# Patient Record
Sex: FEMALE | Race: WHITE | Employment: OTHER | URBAN - METROPOLITAN AREA
[De-identification: names, ages, dates, MRNs, and addresses within clinical notes are randomized per-mention and may not be internally consistent; named-entity substitution may affect disease eponyms.]

---

## 2017-11-28 ENCOUNTER — HOSPITAL ENCOUNTER (OUTPATIENT)
Dept: OTHER | Age: 77
Discharge: HOME | End: 2017-11-28
Attending: INTERNAL MEDICINE | Admitting: INTERNAL MEDICINE

## 2017-11-28 DIAGNOSIS — R06.02 SHORT OF BREATH ON EXERTION: ICD-10-CM

## 2017-12-01 ENCOUNTER — HOSPITAL ENCOUNTER (OUTPATIENT)
Dept: ULTRASOUND IMAGING | Age: 77
Discharge: HOME | End: 2017-12-01
Attending: INTERNAL MEDICINE | Admitting: INTERNAL MEDICINE

## 2017-12-01 ENCOUNTER — HOSPITAL ENCOUNTER (OUTPATIENT)
Dept: NON INVASIVE DIAGNOSTICS | Age: 77
Discharge: HOME | End: 2017-12-01
Attending: INTERNAL MEDICINE | Admitting: INTERNAL MEDICINE

## 2017-12-01 DIAGNOSIS — N18.9 CHRONIC KIDNEY DISEASE, UNSPECIFIED CKD STAGE: ICD-10-CM

## 2017-12-01 DIAGNOSIS — C92.10 CHRONIC MYELOID LEUKEMIA, BCR/ABL-POSITIVE, NOT HAVING ACHIEVED REMISSION (HCC): ICD-10-CM

## 2017-12-01 LAB
LV EF: 73 %
LVEF MODALITY: NORMAL

## 2018-02-26 RX ORDER — DIGOXIN 125 UG/1
TABLET ORAL
Qty: 90 TABLET | OUTPATIENT
Start: 2018-02-26

## 2018-06-05 ENCOUNTER — HOSPITAL ENCOUNTER (OUTPATIENT)
Dept: OTHER | Age: 78
Discharge: HOME | End: 2018-06-05
Attending: CLINICAL NURSE SPECIALIST | Admitting: CLINICAL NURSE SPECIALIST

## 2018-06-05 DIAGNOSIS — R06.00 DYSPNEA AND RESPIRATORY ABNORMALITY: ICD-10-CM

## 2018-06-05 DIAGNOSIS — R06.89 DYSPNEA AND RESPIRATORY ABNORMALITY: ICD-10-CM

## 2019-06-20 ENCOUNTER — HOSPITAL ENCOUNTER (INPATIENT)
Age: 79
LOS: 12 days | Discharge: HOME HEALTH | DRG: 871 | End: 2019-07-02
Attending: EMERGENCY MEDICINE | Admitting: HOSPITALIST
Payer: MEDICARE

## 2019-06-20 ENCOUNTER — APPOINTMENT (OUTPATIENT)
Dept: GENERAL RADIOLOGY | Age: 79
DRG: 871 | End: 2019-06-20
Payer: MEDICARE

## 2019-06-20 DIAGNOSIS — N30.01 ACUTE CYSTITIS WITH HEMATURIA: ICD-10-CM

## 2019-06-20 DIAGNOSIS — I21.4 NSTEMI (NON-ST ELEVATED MYOCARDIAL INFARCTION) (HCC): Primary | ICD-10-CM

## 2019-06-20 DIAGNOSIS — D72.829 LEUKOCYTOSIS, UNSPECIFIED TYPE: ICD-10-CM

## 2019-06-20 DIAGNOSIS — R79.89 ELEVATED BRAIN NATRIURETIC PEPTIDE (BNP) LEVEL: ICD-10-CM

## 2019-06-20 DIAGNOSIS — N17.9 AKI (ACUTE KIDNEY INJURY) (HCC): ICD-10-CM

## 2019-06-20 DIAGNOSIS — E86.0 DEHYDRATION: ICD-10-CM

## 2019-06-20 PROBLEM — N30.00 ACUTE CYSTITIS WITHOUT HEMATURIA: Status: ACTIVE | Noted: 2019-06-20

## 2019-06-20 PROBLEM — L03.114 CELLULITIS OF LEFT UPPER EXTREMITY: Status: ACTIVE | Noted: 2019-06-20

## 2019-06-20 PROBLEM — I50.43 CHF (CONGESTIVE HEART FAILURE), NYHA CLASS I, ACUTE ON CHRONIC, COMBINED (HCC): Status: ACTIVE | Noted: 2019-06-20

## 2019-06-20 LAB
A/G RATIO: 0.8 (ref 1.1–2.2)
ALBUMIN SERPL-MCNC: 3.2 G/DL (ref 3.4–5)
ALP BLD-CCNC: 97 U/L (ref 40–129)
ALT SERPL-CCNC: 6 U/L (ref 10–40)
ANION GAP SERPL CALCULATED.3IONS-SCNC: 17 MMOL/L (ref 3–16)
AST SERPL-CCNC: 14 U/L (ref 15–37)
BASOPHILS ABSOLUTE: 0.1 K/UL (ref 0–0.2)
BASOPHILS RELATIVE PERCENT: 0.8 %
BILIRUB SERPL-MCNC: 0.4 MG/DL (ref 0–1)
BILIRUBIN URINE: ABNORMAL
BLOOD, URINE: NEGATIVE
BUN BLDV-MCNC: 49 MG/DL (ref 7–20)
CALCIUM SERPL-MCNC: 9.3 MG/DL (ref 8.3–10.6)
CHLORIDE BLD-SCNC: 95 MMOL/L (ref 99–110)
CLARITY: ABNORMAL
CO2: 22 MMOL/L (ref 21–32)
COLOR: ABNORMAL
CREAT SERPL-MCNC: 2.4 MG/DL (ref 0.6–1.2)
EOSINOPHILS ABSOLUTE: 0.4 K/UL (ref 0–0.6)
EOSINOPHILS RELATIVE PERCENT: 2.3 %
EPITHELIAL CELLS, UA: 0 /HPF (ref 0–5)
GFR AFRICAN AMERICAN: 23
GFR NON-AFRICAN AMERICAN: 19
GLOBULIN: 3.9 G/DL
GLUCOSE BLD-MCNC: 105 MG/DL (ref 70–99)
GLUCOSE URINE: NEGATIVE MG/DL
HCT VFR BLD CALC: 43.4 % (ref 36–48)
HEMOGLOBIN: 14 G/DL (ref 12–16)
HYALINE CASTS: 8 /LPF (ref 0–8)
KETONES, URINE: NEGATIVE MG/DL
LACTIC ACID: 1.8 MMOL/L (ref 0.4–2)
LEUKOCYTE ESTERASE, URINE: ABNORMAL
LYMPHOCYTES ABSOLUTE: 0.5 K/UL (ref 1–5.1)
LYMPHOCYTES RELATIVE PERCENT: 3.4 %
MCH RBC QN AUTO: 28.8 PG (ref 26–34)
MCHC RBC AUTO-ENTMCNC: 32.2 G/DL (ref 31–36)
MCV RBC AUTO: 89.4 FL (ref 80–100)
MICROSCOPIC EXAMINATION: YES
MONOCYTES ABSOLUTE: 1.5 K/UL (ref 0–1.3)
MONOCYTES RELATIVE PERCENT: 9.4 %
NEUTROPHILS ABSOLUTE: 13.1 K/UL (ref 1.7–7.7)
NEUTROPHILS RELATIVE PERCENT: 84.1 %
NITRITE, URINE: NEGATIVE
PDW BLD-RTO: 19.1 % (ref 12.4–15.4)
PH UA: 5 (ref 5–8)
PLATELET # BLD: 639 K/UL (ref 135–450)
PMV BLD AUTO: 7.4 FL (ref 5–10.5)
POTASSIUM REFLEX MAGNESIUM: 4.9 MMOL/L (ref 3.5–5.1)
PRO-BNP: ABNORMAL PG/ML (ref 0–449)
PROTEIN UA: 30 MG/DL
RBC # BLD: 4.85 M/UL (ref 4–5.2)
RBC UA: ABNORMAL /HPF (ref 0–2)
SODIUM BLD-SCNC: 134 MMOL/L (ref 136–145)
SPECIFIC GRAVITY UA: 1.02 (ref 1–1.03)
TOTAL PROTEIN: 7.1 G/DL (ref 6.4–8.2)
TROPONIN: 0.25 NG/ML
URINE REFLEX TO CULTURE: YES
URINE TYPE: ABNORMAL
UROBILINOGEN, URINE: 0.2 E.U./DL
WBC # BLD: 15.6 K/UL (ref 4–11)
WBC UA: 33 /HPF (ref 0–5)

## 2019-06-20 PROCEDURE — 96361 HYDRATE IV INFUSION ADD-ON: CPT

## 2019-06-20 PROCEDURE — 84484 ASSAY OF TROPONIN QUANT: CPT

## 2019-06-20 PROCEDURE — 2580000003 HC RX 258: Performed by: EMERGENCY MEDICINE

## 2019-06-20 PROCEDURE — 93005 ELECTROCARDIOGRAM TRACING: CPT | Performed by: EMERGENCY MEDICINE

## 2019-06-20 PROCEDURE — 96372 THER/PROPH/DIAG INJ SC/IM: CPT

## 2019-06-20 PROCEDURE — 6370000000 HC RX 637 (ALT 250 FOR IP): Performed by: NURSE PRACTITIONER

## 2019-06-20 PROCEDURE — 80053 COMPREHEN METABOLIC PANEL: CPT

## 2019-06-20 PROCEDURE — 81001 URINALYSIS AUTO W/SCOPE: CPT

## 2019-06-20 PROCEDURE — 85025 COMPLETE CBC W/AUTO DIFF WBC: CPT

## 2019-06-20 PROCEDURE — 36415 COLL VENOUS BLD VENIPUNCTURE: CPT

## 2019-06-20 PROCEDURE — 96365 THER/PROPH/DIAG IV INF INIT: CPT

## 2019-06-20 PROCEDURE — 99285 EMERGENCY DEPT VISIT HI MDM: CPT

## 2019-06-20 PROCEDURE — 71046 X-RAY EXAM CHEST 2 VIEWS: CPT

## 2019-06-20 PROCEDURE — 6360000002 HC RX W HCPCS: Performed by: NURSE PRACTITIONER

## 2019-06-20 PROCEDURE — 2580000003 HC RX 258: Performed by: NURSE PRACTITIONER

## 2019-06-20 PROCEDURE — 84439 ASSAY OF FREE THYROXINE: CPT

## 2019-06-20 PROCEDURE — 87077 CULTURE AEROBIC IDENTIFY: CPT

## 2019-06-20 PROCEDURE — 6360000002 HC RX W HCPCS: Performed by: EMERGENCY MEDICINE

## 2019-06-20 PROCEDURE — 87086 URINE CULTURE/COLONY COUNT: CPT

## 2019-06-20 PROCEDURE — 83605 ASSAY OF LACTIC ACID: CPT

## 2019-06-20 PROCEDURE — 87186 SC STD MICRODIL/AGAR DIL: CPT

## 2019-06-20 PROCEDURE — 84443 ASSAY THYROID STIM HORMONE: CPT

## 2019-06-20 PROCEDURE — 1200000000 HC SEMI PRIVATE

## 2019-06-20 PROCEDURE — 83880 ASSAY OF NATRIURETIC PEPTIDE: CPT

## 2019-06-20 RX ORDER — ROPINIROLE 0.25 MG/1
0.25 TABLET, FILM COATED ORAL NIGHTLY
COMMUNITY

## 2019-06-20 RX ORDER — AMITRIPTYLINE HYDROCHLORIDE 10 MG/1
10 TABLET, FILM COATED ORAL NIGHTLY
Status: DISCONTINUED | OUTPATIENT
Start: 2019-06-20 | End: 2019-07-02 | Stop reason: HOSPADM

## 2019-06-20 RX ORDER — ACETAMINOPHEN 325 MG/1
650 TABLET ORAL EVERY 4 HOURS PRN
Status: DISCONTINUED | OUTPATIENT
Start: 2019-06-20 | End: 2019-07-02 | Stop reason: HOSPADM

## 2019-06-20 RX ORDER — PREDNISONE 1 MG/1
5 TABLET ORAL DAILY
Status: DISCONTINUED | OUTPATIENT
Start: 2019-06-21 | End: 2019-07-02 | Stop reason: HOSPADM

## 2019-06-20 RX ORDER — HEPARIN SODIUM 5000 [USP'U]/ML
5000 INJECTION, SOLUTION INTRAVENOUS; SUBCUTANEOUS EVERY 8 HOURS SCHEDULED
Status: DISCONTINUED | OUTPATIENT
Start: 2019-06-20 | End: 2019-06-20

## 2019-06-20 RX ORDER — FUROSEMIDE 10 MG/ML
40 INJECTION INTRAMUSCULAR; INTRAVENOUS ONCE
Status: COMPLETED | OUTPATIENT
Start: 2019-06-20 | End: 2019-06-20

## 2019-06-20 RX ORDER — DIAZEPAM 5 MG/1
5 TABLET ORAL EVERY 12 HOURS PRN
Status: DISCONTINUED | OUTPATIENT
Start: 2019-06-20 | End: 2019-06-26

## 2019-06-20 RX ORDER — LISINOPRIL 5 MG/1
5 TABLET ORAL DAILY
Status: DISCONTINUED | OUTPATIENT
Start: 2019-06-21 | End: 2019-06-21

## 2019-06-20 RX ORDER — PREDNISONE 1 MG/1
5 TABLET ORAL DAILY
Refills: 0 | COMMUNITY
Start: 2019-03-20

## 2019-06-20 RX ORDER — ROPINIROLE 0.5 MG/1
0.25 TABLET, FILM COATED ORAL NIGHTLY
Status: DISCONTINUED | OUTPATIENT
Start: 2019-06-20 | End: 2019-07-02 | Stop reason: HOSPADM

## 2019-06-20 RX ORDER — SODIUM CHLORIDE 0.9 % (FLUSH) 0.9 %
10 SYRINGE (ML) INJECTION PRN
Status: DISCONTINUED | OUTPATIENT
Start: 2019-06-20 | End: 2019-07-02 | Stop reason: HOSPADM

## 2019-06-20 RX ORDER — POTASSIUM CHLORIDE 7.45 MG/ML
10 INJECTION INTRAVENOUS PRN
Status: DISCONTINUED | OUTPATIENT
Start: 2019-06-20 | End: 2019-06-20

## 2019-06-20 RX ORDER — AMITRIPTYLINE HYDROCHLORIDE 10 MG/1
10 TABLET, FILM COATED ORAL NIGHTLY
COMMUNITY

## 2019-06-20 RX ORDER — HEPARIN SODIUM 5000 [USP'U]/ML
5000 INJECTION, SOLUTION INTRAVENOUS; SUBCUTANEOUS EVERY 8 HOURS SCHEDULED
Status: DISCONTINUED | OUTPATIENT
Start: 2019-06-21 | End: 2019-06-21

## 2019-06-20 RX ORDER — BUPROPION HYDROCHLORIDE 150 MG/1
150 TABLET ORAL DAILY
Refills: 1 | COMMUNITY
Start: 2019-04-24

## 2019-06-20 RX ORDER — POTASSIUM CHLORIDE 750 MG/1
10 TABLET, FILM COATED, EXTENDED RELEASE ORAL DAILY
Status: DISCONTINUED | OUTPATIENT
Start: 2019-06-21 | End: 2019-06-25

## 2019-06-20 RX ORDER — SODIUM CHLORIDE 0.9 % (FLUSH) 0.9 %
10 SYRINGE (ML) INJECTION EVERY 12 HOURS SCHEDULED
Status: DISCONTINUED | OUTPATIENT
Start: 2019-06-20 | End: 2019-07-02 | Stop reason: HOSPADM

## 2019-06-20 RX ORDER — 0.9 % SODIUM CHLORIDE 0.9 %
2000 INTRAVENOUS SOLUTION INTRAVENOUS ONCE
Status: COMPLETED | OUTPATIENT
Start: 2019-06-20 | End: 2019-06-20

## 2019-06-20 RX ORDER — FLUOXETINE HYDROCHLORIDE 20 MG/1
20 CAPSULE ORAL DAILY
Status: DISCONTINUED | OUTPATIENT
Start: 2019-06-21 | End: 2019-07-02 | Stop reason: HOSPADM

## 2019-06-20 RX ORDER — POTASSIUM CHLORIDE 20 MEQ/1
40 TABLET, EXTENDED RELEASE ORAL PRN
Status: DISCONTINUED | OUTPATIENT
Start: 2019-06-20 | End: 2019-06-20

## 2019-06-20 RX ORDER — FOLIC ACID 1 MG/1
1 TABLET ORAL DAILY
Status: DISCONTINUED | OUTPATIENT
Start: 2019-06-21 | End: 2019-07-02 | Stop reason: HOSPADM

## 2019-06-20 RX ORDER — ONDANSETRON 2 MG/ML
4 INJECTION INTRAMUSCULAR; INTRAVENOUS EVERY 6 HOURS PRN
Status: DISCONTINUED | OUTPATIENT
Start: 2019-06-20 | End: 2019-07-02 | Stop reason: HOSPADM

## 2019-06-20 RX ORDER — BUPROPION HYDROCHLORIDE 150 MG/1
150 TABLET ORAL DAILY
Status: DISCONTINUED | OUTPATIENT
Start: 2019-06-21 | End: 2019-07-02 | Stop reason: HOSPADM

## 2019-06-20 RX ADMIN — SODIUM CHLORIDE 2000 ML: 9 INJECTION, SOLUTION INTRAVENOUS at 18:55

## 2019-06-20 RX ADMIN — PREDNISOLONE ACETATE 1 DROP: 1.2 SUSPENSION/ DROPS OPHTHALMIC at 23:23

## 2019-06-20 RX ADMIN — Medication 10 ML: at 23:11

## 2019-06-20 RX ADMIN — AMITRIPTYLINE HYDROCHLORIDE 10 MG: 10 TABLET, FILM COATED ORAL at 23:10

## 2019-06-20 RX ADMIN — CEFTRIAXONE 1 G: 1 INJECTION, POWDER, FOR SOLUTION INTRAMUSCULAR; INTRAVENOUS at 20:46

## 2019-06-20 RX ADMIN — ROPINIROLE HYDROCHLORIDE 0.25 MG: 0.5 TABLET, FILM COATED ORAL at 23:10

## 2019-06-20 RX ADMIN — ENOXAPARIN SODIUM 90 MG: 100 INJECTION SUBCUTANEOUS at 18:56

## 2019-06-20 RX ADMIN — FUROSEMIDE 40 MG: 10 INJECTION, SOLUTION INTRAMUSCULAR; INTRAVENOUS at 23:10

## 2019-06-20 ASSESSMENT — PAIN SCALES - GENERAL
PAINLEVEL_OUTOF10: 0
PAINLEVEL_OUTOF10: 10

## 2019-06-20 ASSESSMENT — PAIN DESCRIPTION - LOCATION: LOCATION: GENERALIZED

## 2019-06-20 ASSESSMENT — PAIN DESCRIPTION - PROGRESSION: CLINICAL_PROGRESSION: GRADUALLY WORSENING

## 2019-06-20 ASSESSMENT — PAIN DESCRIPTION - FREQUENCY: FREQUENCY: INTERMITTENT

## 2019-06-20 ASSESSMENT — PAIN DESCRIPTION - PAIN TYPE: TYPE: ACUTE PAIN

## 2019-06-20 ASSESSMENT — PAIN - FUNCTIONAL ASSESSMENT: PAIN_FUNCTIONAL_ASSESSMENT: PREVENTS OR INTERFERES SOME ACTIVE ACTIVITIES AND ADLS

## 2019-06-20 ASSESSMENT — PAIN DESCRIPTION - ONSET: ONSET: GRADUAL

## 2019-06-20 ASSESSMENT — PAIN DESCRIPTION - DESCRIPTORS: DESCRIPTORS: ACHING;CONSTANT

## 2019-06-20 NOTE — PROGRESS NOTES
Pharmacy Medication Reconciliation Note     List of medications patient is currently taking is complete. Source of information:   1. Patient   2. EMR    Notes regarding home medications:   1. Patient did not receive any of her home medication doses today before presenting to ER. Pt has not taken any of her doses for three days.       [id number] [facility]ien, Pharmacy Intern  6/20/2019 7:21 PM

## 2019-06-21 ENCOUNTER — APPOINTMENT (OUTPATIENT)
Dept: CT IMAGING | Age: 79
DRG: 871 | End: 2019-06-21
Payer: MEDICARE

## 2019-06-21 ENCOUNTER — APPOINTMENT (OUTPATIENT)
Dept: ULTRASOUND IMAGING | Age: 79
DRG: 871 | End: 2019-06-21
Payer: MEDICARE

## 2019-06-21 LAB
ANION GAP SERPL CALCULATED.3IONS-SCNC: 15 MMOL/L (ref 3–16)
ANION GAP SERPL CALCULATED.3IONS-SCNC: 17 MMOL/L (ref 3–16)
BASOPHILS ABSOLUTE: 0.1 K/UL (ref 0–0.2)
BASOPHILS RELATIVE PERCENT: 0.7 %
BUN BLDV-MCNC: 51 MG/DL (ref 7–20)
BUN BLDV-MCNC: 52 MG/DL (ref 7–20)
CALCIUM SERPL-MCNC: 8.4 MG/DL (ref 8.3–10.6)
CALCIUM SERPL-MCNC: 8.6 MG/DL (ref 8.3–10.6)
CHLORIDE BLD-SCNC: 95 MMOL/L (ref 99–110)
CHLORIDE BLD-SCNC: 98 MMOL/L (ref 99–110)
CHLORIDE URINE RANDOM: <20 MMOL/L
CHOLESTEROL, TOTAL: 114 MG/DL (ref 0–199)
CO2: 19 MMOL/L (ref 21–32)
CO2: 20 MMOL/L (ref 21–32)
CREAT SERPL-MCNC: 2.2 MG/DL (ref 0.6–1.2)
CREAT SERPL-MCNC: 2.3 MG/DL (ref 0.6–1.2)
CREATININE URINE: 161.8 MG/DL (ref 28–259)
DIGOXIN LEVEL: 1.5 NG/ML (ref 0.8–2)
EKG ATRIAL RATE: 108 BPM
EKG ATRIAL RATE: 122 BPM
EKG ATRIAL RATE: 92 BPM
EKG DIAGNOSIS: NORMAL
EKG P AXIS: 0 DEGREES
EKG P-R INTERVAL: 214 MS
EKG Q-T INTERVAL: 296 MS
EKG Q-T INTERVAL: 298 MS
EKG Q-T INTERVAL: 320 MS
EKG QRS DURATION: 104 MS
EKG QRS DURATION: 90 MS
EKG QRS DURATION: 92 MS
EKG QTC CALCULATION (BAZETT): 368 MS
EKG QTC CALCULATION (BAZETT): 423 MS
EKG QTC CALCULATION (BAZETT): 427 MS
EKG R AXIS: -52 DEGREES
EKG R AXIS: -54 DEGREES
EKG R AXIS: -57 DEGREES
EKG T AXIS: 143 DEGREES
EKG T AXIS: 145 DEGREES
EKG T AXIS: 162 DEGREES
EKG VENTRICULAR RATE: 107 BPM
EKG VENTRICULAR RATE: 123 BPM
EKG VENTRICULAR RATE: 92 BPM
EOSINOPHIL,URINE: NORMAL
EOSINOPHILS ABSOLUTE: 0.4 K/UL (ref 0–0.6)
EOSINOPHILS RELATIVE PERCENT: 1.9 %
GFR AFRICAN AMERICAN: 25
GFR AFRICAN AMERICAN: 26
GFR NON-AFRICAN AMERICAN: 20
GFR NON-AFRICAN AMERICAN: 21
GLUCOSE BLD-MCNC: 105 MG/DL (ref 70–99)
GLUCOSE BLD-MCNC: 94 MG/DL (ref 70–99)
HCT VFR BLD CALC: 38.1 % (ref 36–48)
HDLC SERPL-MCNC: 23 MG/DL (ref 40–60)
HEMOGLOBIN: 12.2 G/DL (ref 12–16)
LDL CHOLESTEROL CALCULATED: 69 MG/DL
LYMPHOCYTES ABSOLUTE: 0.4 K/UL (ref 1–5.1)
LYMPHOCYTES RELATIVE PERCENT: 1.7 %
MAGNESIUM: 2.2 MG/DL (ref 1.8–2.4)
MAGNESIUM: 2.2 MG/DL (ref 1.8–2.4)
MCH RBC QN AUTO: 28.6 PG (ref 26–34)
MCHC RBC AUTO-ENTMCNC: 32.1 G/DL (ref 31–36)
MCV RBC AUTO: 89 FL (ref 80–100)
MONOCYTES ABSOLUTE: 1.8 K/UL (ref 0–1.3)
MONOCYTES RELATIVE PERCENT: 8.9 %
NEUTROPHILS ABSOLUTE: 17.5 K/UL (ref 1.7–7.7)
NEUTROPHILS RELATIVE PERCENT: 86.8 %
PDW BLD-RTO: 19.2 % (ref 12.4–15.4)
PLATELET # BLD: 636 K/UL (ref 135–450)
PMV BLD AUTO: 7.7 FL (ref 5–10.5)
POTASSIUM SERPL-SCNC: 4.2 MMOL/L (ref 3.5–5.1)
POTASSIUM SERPL-SCNC: 4.3 MMOL/L (ref 3.5–5.1)
POTASSIUM, UR: 41.2 MMOL/L
RBC # BLD: 4.28 M/UL (ref 4–5.2)
SODIUM BLD-SCNC: 131 MMOL/L (ref 136–145)
SODIUM BLD-SCNC: 133 MMOL/L (ref 136–145)
SODIUM URINE: <20 MMOL/L
T4 FREE: 1.1 NG/DL (ref 0.9–1.8)
TRIGL SERPL-MCNC: 109 MG/DL (ref 0–150)
TROPONIN: 0.27 NG/ML
TROPONIN: 0.28 NG/ML
TSH SERPL DL<=0.05 MIU/L-ACNC: 5.93 UIU/ML (ref 0.27–4.2)
VLDLC SERPL CALC-MCNC: 22 MG/DL
WBC # BLD: 20.2 K/UL (ref 4–11)

## 2019-06-21 PROCEDURE — 94761 N-INVAS EAR/PLS OXIMETRY MLT: CPT

## 2019-06-21 PROCEDURE — 93010 ELECTROCARDIOGRAM REPORT: CPT | Performed by: INTERNAL MEDICINE

## 2019-06-21 PROCEDURE — 2700000000 HC OXYGEN THERAPY PER DAY

## 2019-06-21 PROCEDURE — 76770 US EXAM ABDO BACK WALL COMP: CPT

## 2019-06-21 PROCEDURE — 87205 SMEAR GRAM STAIN: CPT

## 2019-06-21 PROCEDURE — 6360000002 HC RX W HCPCS: Performed by: INTERNAL MEDICINE

## 2019-06-21 PROCEDURE — 84300 ASSAY OF URINE SODIUM: CPT

## 2019-06-21 PROCEDURE — 2500000003 HC RX 250 WO HCPCS: Performed by: INTERNAL MEDICINE

## 2019-06-21 PROCEDURE — 85025 COMPLETE CBC W/AUTO DIFF WBC: CPT

## 2019-06-21 PROCEDURE — 84484 ASSAY OF TROPONIN QUANT: CPT

## 2019-06-21 PROCEDURE — 6360000002 HC RX W HCPCS: Performed by: NURSE PRACTITIONER

## 2019-06-21 PROCEDURE — 6370000000 HC RX 637 (ALT 250 FOR IP): Performed by: NURSE PRACTITIONER

## 2019-06-21 PROCEDURE — 93005 ELECTROCARDIOGRAM TRACING: CPT | Performed by: INTERNAL MEDICINE

## 2019-06-21 PROCEDURE — 2580000003 HC RX 258: Performed by: INTERNAL MEDICINE

## 2019-06-21 PROCEDURE — 82570 ASSAY OF URINE CREATININE: CPT

## 2019-06-21 PROCEDURE — 82436 ASSAY OF URINE CHLORIDE: CPT

## 2019-06-21 PROCEDURE — 2060000000 HC ICU INTERMEDIATE R&B

## 2019-06-21 PROCEDURE — 80061 LIPID PANEL: CPT

## 2019-06-21 PROCEDURE — 36415 COLL VENOUS BLD VENIPUNCTURE: CPT

## 2019-06-21 PROCEDURE — 83735 ASSAY OF MAGNESIUM: CPT

## 2019-06-21 PROCEDURE — 71250 CT THORAX DX C-: CPT

## 2019-06-21 PROCEDURE — 80162 ASSAY OF DIGOXIN TOTAL: CPT

## 2019-06-21 PROCEDURE — 2580000003 HC RX 258: Performed by: NURSE PRACTITIONER

## 2019-06-21 PROCEDURE — 99223 1ST HOSP IP/OBS HIGH 75: CPT | Performed by: INTERNAL MEDICINE

## 2019-06-21 PROCEDURE — 80048 BASIC METABOLIC PNL TOTAL CA: CPT

## 2019-06-21 PROCEDURE — 84133 ASSAY OF URINE POTASSIUM: CPT

## 2019-06-21 RX ORDER — HEPARIN SODIUM 5000 [USP'U]/ML
5000 INJECTION, SOLUTION INTRAVENOUS; SUBCUTANEOUS EVERY 8 HOURS SCHEDULED
Status: DISCONTINUED | OUTPATIENT
Start: 2019-06-21 | End: 2019-07-01

## 2019-06-21 RX ORDER — SODIUM CHLORIDE 9 MG/ML
INJECTION, SOLUTION INTRAVENOUS CONTINUOUS
Status: DISCONTINUED | OUTPATIENT
Start: 2019-06-21 | End: 2019-06-22

## 2019-06-21 RX ORDER — HYDROCODONE BITARTRATE AND ACETAMINOPHEN 10; 325 MG/1; MG/1
1 TABLET ORAL EVERY 6 HOURS PRN
Status: DISCONTINUED | OUTPATIENT
Start: 2019-06-21 | End: 2019-07-02 | Stop reason: HOSPADM

## 2019-06-21 RX ORDER — METOPROLOL TARTRATE 5 MG/5ML
2.5 INJECTION INTRAVENOUS ONCE
Status: COMPLETED | OUTPATIENT
Start: 2019-06-21 | End: 2019-06-21

## 2019-06-21 RX ORDER — METOPROLOL TARTRATE 5 MG/5ML
5 INJECTION INTRAVENOUS ONCE
Status: DISCONTINUED | OUTPATIENT
Start: 2019-06-21 | End: 2019-06-21

## 2019-06-21 RX ORDER — METOPROLOL TARTRATE 5 MG/5ML
2.5 INJECTION INTRAVENOUS EVERY 6 HOURS PRN
Status: DISCONTINUED | OUTPATIENT
Start: 2019-06-21 | End: 2019-06-26

## 2019-06-21 RX ADMIN — PREDNISOLONE ACETATE 1 DROP: 1.2 SUSPENSION/ DROPS OPHTHALMIC at 20:15

## 2019-06-21 RX ADMIN — HEPARIN SODIUM 5000 UNITS: 5000 INJECTION INTRAVENOUS; SUBCUTANEOUS at 21:41

## 2019-06-21 RX ADMIN — BUPROPION HYDROCHLORIDE 150 MG: 150 TABLET, FILM COATED, EXTENDED RELEASE ORAL at 08:21

## 2019-06-21 RX ADMIN — CEFTRIAXONE 1 G: 1 INJECTION, POWDER, FOR SOLUTION INTRAMUSCULAR; INTRAVENOUS at 20:55

## 2019-06-21 RX ADMIN — SODIUM CHLORIDE: 9 INJECTION, SOLUTION INTRAVENOUS at 14:30

## 2019-06-21 RX ADMIN — LEVOTHYROXINE SODIUM 137 MCG: 25 TABLET ORAL at 06:23

## 2019-06-21 RX ADMIN — PREDNISOLONE ACETATE 1 DROP: 1.2 SUSPENSION/ DROPS OPHTHALMIC at 14:04

## 2019-06-21 RX ADMIN — Medication 10 ML: at 20:14

## 2019-06-21 RX ADMIN — ACETAMINOPHEN 650 MG: 325 TABLET ORAL at 23:23

## 2019-06-21 RX ADMIN — FOLIC ACID 1 MG: 1 TABLET ORAL at 08:21

## 2019-06-21 RX ADMIN — HEPARIN SODIUM 5000 UNITS: 5000 INJECTION INTRAVENOUS; SUBCUTANEOUS at 13:53

## 2019-06-21 RX ADMIN — PREDNISONE 5 MG: 5 TABLET ORAL at 08:21

## 2019-06-21 RX ADMIN — PREDNISOLONE ACETATE 1 DROP: 1.2 SUSPENSION/ DROPS OPHTHALMIC at 09:42

## 2019-06-21 RX ADMIN — POTASSIUM CHLORIDE 10 MEQ: 750 TABLET, FILM COATED, EXTENDED RELEASE ORAL at 08:21

## 2019-06-21 RX ADMIN — AMITRIPTYLINE HYDROCHLORIDE 10 MG: 10 TABLET, FILM COATED ORAL at 20:13

## 2019-06-21 RX ADMIN — ROPINIROLE HYDROCHLORIDE 0.25 MG: 0.5 TABLET, FILM COATED ORAL at 20:13

## 2019-06-21 RX ADMIN — POLYETHYLENE GLYCOL, PROPYLENE GLYCOL 2 DROP: .4; .3 LIQUID OPHTHALMIC at 11:40

## 2019-06-21 RX ADMIN — LISINOPRIL 5 MG: 5 TABLET ORAL at 09:42

## 2019-06-21 RX ADMIN — FLUOXETINE 20 MG: 20 CAPSULE ORAL at 08:21

## 2019-06-21 RX ADMIN — METOPROLOL TARTRATE 2.5 MG: 5 INJECTION INTRAVENOUS at 13:08

## 2019-06-21 RX ADMIN — Medication 10 ML: at 08:23

## 2019-06-21 RX ADMIN — HYDROCODONE BITARTRATE AND ACETAMINOPHEN 1 TABLET: 10; 325 TABLET ORAL at 06:20

## 2019-06-21 ASSESSMENT — PAIN SCALES - GENERAL
PAINLEVEL_OUTOF10: 10
PAINLEVEL_OUTOF10: 0
PAINLEVEL_OUTOF10: 10
PAINLEVEL_OUTOF10: 0
PAINLEVEL_OUTOF10: 0

## 2019-06-21 ASSESSMENT — PAIN DESCRIPTION - PAIN TYPE
TYPE: ACUTE PAIN
TYPE: ACUTE PAIN

## 2019-06-21 ASSESSMENT — PAIN - FUNCTIONAL ASSESSMENT
PAIN_FUNCTIONAL_ASSESSMENT: PREVENTS OR INTERFERES SOME ACTIVE ACTIVITIES AND ADLS
PAIN_FUNCTIONAL_ASSESSMENT: PREVENTS OR INTERFERES SOME ACTIVE ACTIVITIES AND ADLS

## 2019-06-21 ASSESSMENT — PAIN DESCRIPTION - ORIENTATION: ORIENTATION: LEFT

## 2019-06-21 ASSESSMENT — PAIN DESCRIPTION - LOCATION
LOCATION: GENERALIZED
LOCATION: GENERALIZED;ARM

## 2019-06-21 ASSESSMENT — PAIN DESCRIPTION - PROGRESSION: CLINICAL_PROGRESSION: NOT CHANGED

## 2019-06-21 ASSESSMENT — PAIN DESCRIPTION - ONSET
ONSET: ON-GOING
ONSET: ON-GOING

## 2019-06-21 ASSESSMENT — PAIN DESCRIPTION - FREQUENCY
FREQUENCY: CONTINUOUS
FREQUENCY: CONTINUOUS

## 2019-06-21 ASSESSMENT — PAIN DESCRIPTION - DESCRIPTORS
DESCRIPTORS: ACHING;CONSTANT
DESCRIPTORS: ACHING;CONSTANT

## 2019-06-21 NOTE — CONSULTS
[address] [address]                                  CONSULTATION    PATIENT NAME: Nasrin Garnica                   :        1940  MED REC NO:   [id number]                          ROOM:       [id number]  ACCOUNT NO:   [de-identified]                           ADMIT DATE: 2019  PROVIDER:     Maegan Denney MD    CONSULT DATE:  2019    REASON FOR CONSULTATION:  Acute kidney injury. HISTORY OF PRESENTING ILLNESS:  She is an 58-year-old  female  with past medical history significant for hypertension, chronic gout,  CML, chronic rheumatoid arthritis with multiple complications, chronic  kidney disease stage III, recurrent UTI, admitted feeling weak, tired,  decreased level of energy and decreased urine output. The patient was  diagnosed with acute kidney injury as well as urinary tract infection. Renal consultation has been called for acute kidney injury. Apparently,  the patient stopped taking all her medications three days prior to  coming to the hospital.    At the time of consultation, the patient feeling better, still feeling  weak, tired, decreased level of energy. Complaining of some increased  frequency and dysuria. Complaining of generalized headache, body ache,  and arthritis pain, more worse than usual.  Admits poor appetite and  decreased level of energy. PAST MEDICAL HISTORY:  Chronic kidney disease stage III, hypertension,  CML, coronary artery disease, diastolic heart failure, chronic  rheumatoid arthritis (crippling), hypothyroidism, anxiety/depression,  congenital heart disease per record, chronic recurrent UTIs. PAST SURGICAL HISTORY:  1.  Status post cholecystectomy. 2.  Status post total knee arthroplasty. MEDICATIONS:  Include Elavil, Requip, Wellbutrin, hydrocodone, Valium,  Folvyte, Lasix, Prozac, potassium, Enbrel. ALLERGIES:  LATEX and SULFA.     SOCIAL HISTORY: Does not smoke or drink. FAMILY HISTORY:  Not significant for any kidney disease. REVIEW OF SYSTEMS:  Hard of hearing. No vision problem. No hemostasis,  vomiting, diarrhea. Feeling weak, tired, decreased level of energy. Denies any nausea, vomiting. Admits poor appetite. Denies any  hematuria but has some dysuria and increased frequency of micturition. Feeling a bit anxious and depressed. Complaining of joint pain all over  the body. PHYSICAL EXAMINATION:  GENERAL:  Lying in bed, looks comfortable. VITAL SIGNS:  Blood pressure 93/61, pulse 124, temperature 97.4. HEENT:  Pupils reactive to light. CHEST:  Decreased breath sounds, both bases. CVS:  S1, S2 audible. Regular rate and rhythm. ABDOMEN:  Soft, nontender. Positive bowel sounds. EXTREMITIES:  Trace edema. CNS:  Nonfocal.    LABORATORY DATA:  WBC 20.3, hemoglobin 12.2, hematocrit 38, platelet  count of 780. Sodium 131, potassium 4.3, chloride 95, bicarb 19, BUN  51, creatinine 2.2, calcium 8.6. IMPRESSION:  1. Acute kidney injury secondary to #1 prerenal, #2 urinary tract  infection, #3 hypotension. 2. Chronic kidney disease stage III, most likely secondary to multiple  chronic conditions and medication. 3.  History of long chronic rheumatoid arthritis with systemic  involvement possibly. 4.  Anemia of chronic disease. 5.  Hypotension. 6.  Urinary tract infection. 7.  Coronary artery disease. 8.  CML. PLAN:  1. Daily weight. 2.  Strict I's and O's.  3.  Check the postvoid residual.  4.  Hold Lasix. 5.  Add gentle hydration. 6.  Follow up with the urine culture. 7.  Continue with antibiotic. 8.  Check ultrasound of the kidney. 9.  Check the phosphorus and PTH. The case was discussed in detail with the patient as well as her  daughter present at the bedside. We will follow.         Snow Powers MD    D: 06/21/2019 14:07:04       T: 06/21/2019 18:50:55     [email or web address]  Job#: [id number]     Doc#: [id number]    CC: <>

## 2019-06-21 NOTE — PROGRESS NOTES
[facility] called to report pt. In junctional tach with . B/P 99/62. Asymptomatic. Notified Dr. Samantha Ambriz.

## 2019-06-21 NOTE — CONSULTS
HF RN consult received from [facility] CHARISSE VALDEZ NP as part of HF order set. Patient presented to ED with c/o decreased PO intake x 3-4 days. Pt has been home alone as spouse is currently hospitalized. She reports not taking her scheduled meds as she has not been eating. She has severe rheumatoid arthritis and has not taken usual pain medications. Pt also has CML but reports it is in remission. She carries diagnosis of diastolic HF but has no established cardiologist on record. Per [facility], pt was seen by PCP 3/14/19 for routine visit for depression and reported SOB, GUERRERO, and lingering cough. Weight 171#. She was instructed to increase her lasix to BID and get echo / labs. Echo done 3/26/19 at [address] [id number] showed preserved EF 55-60%, mod AS, Grade I DD and biatrial dilation. At 3/28/19 follow up PCP visit, she was informed of echo findings of diastolic HF and valvular incompetence. Weight 179#. She was referred to cardiology but there is no evidence of appointment, etc.     ProBNP on admission was 52,849 with Cr 2.4. Pt carries diagnosis of CKD stage 3 with last Cr from July 2017 1.2. Trop 0.25 that later peaked at 0.28. Plt 639K c/w CML. Weight 200# (ED) and 178# (floor). CXR showed pulm edema. Pt was initially treated with IVFs and received one liter with second liter held in setting of CHF. Pt admitted for CHF/NSTEMI/UTI/cellulitis. Cardiology was consulted. Pt was treated with IV bolus Lasix but had no appreciable urine output. Dr Rosenda Worrell noted patient having asymptomatic PSVT and felt trop elevation potentially r/t NOBLE, UTI, and tachyarrhythmia. He noted chronic diastolic HF but okay'd IVFs for NOBLE. HF measures have been implemented: daily weights, I/O, and sodium / fluid restricted diet. HF careplan is current. HF instructions have been added to AVS.  Of note, pt's  is being discharged to [facility]. HF RNs will continue to follow and assist with transition of care at discharge.   As HF diagnosis is relatively new, HF RNs will meet with pt for further education if she remains hospitalized Mon Jun 24. HF RN reached out to PCP, Dr Suzette Reynolds, for 7 day follow up in event of weekend discharge. Office was unable to provide an appointment and took message for RN to return call. They did not return call prior to close of office. As pt was deemed \"chronic\" HF, no appt was arranged with [facility] either. HF RNs will follow up as indicated if pt remains in house.

## 2019-06-21 NOTE — FLOWSHEET NOTE
06/21/19 1153   Encounter Summary   Services provided to: Patient and family together   Referral/Consult From: Nurse   Support System Spouse; Children   Place of Mandaeism [employer]   (visit, prayer, blessing, AD doc discussion 6/21 CL)   Complexity of Encounter Moderate   Length of Encounter 15 minutes   Spiritual/Yarsani   Type Spiritual support   Assessment Calm; Approachable; Hopeful;Coping   Intervention Active listening;Explored feelings, thoughts, concerns;Prayer;Discussed belief system/Islam practices/naomie;Discussed illness/injury and it's impact   Outcome Engaged in conversation;Coping; Hopeful   Advance Directives (For Healthcare)   Healthcare Directive No, patient does not have an advance directive for healthcare treatment   Advance Directives Documents given;Documents explained   Electronically signed by Alvaro Hall on 6/21/2019 at 11:55 AM

## 2019-06-21 NOTE — H&P
CHOLECYSTECTOMY      GALLBLADDER SURGERY      TOTAL KNEE ARTHROPLASTY Bilateral 2008       Medications Prior to Admission:      Prior to Admission medications    Medication Sig Start Date End Date Taking? Authorizing Provider   dasatinib (SPRYCEL) 50 MG chemo tablet Take 50 mg by mouth daily   Yes Historical Provider, MD   amitriptyline (ELAVIL) 10 MG tablet Take 10 mg by mouth nightly   Yes Historical Provider, MD   rOPINIRole (REQUIP) 0.25 MG tablet Take 0.25 mg by mouth nightly   Yes Historical Provider, MD   predniSONE (DELTASONE) 5 MG tablet Take 5 mg by mouth daily 3/20/19  Yes Historical Provider, MD   buPROPion (WELLBUTRIN XL) 150 MG extended release tablet Take 150 mg by mouth daily 4/24/19  Yes Historical Provider, MD   DIGOX 125 MCG tablet TAKE 1 TABLET BY MOUTH DAILY 2/28/17  Yes Yeyo Macario MD   methotrexate (RHEUMATREX) 2.5 MG chemo tablet TAKE 5 TABLETS BY MOUTH 1 TIME WEEKLY 2/2/17  Yes Yeyo Macario MD   HYDROcodone-acetaminophen (NORCO)  MG per tablet Take 1 tablet by mouth every 6 hours as needed for Pain  Patient taking differently: Take 1 tablet by mouth 2 times daily as needed for Pain.   2/8/16  Yes Yeyo Macario MD   diazepam (VALIUM) 5 MG tablet Take 1 tablet by mouth every 12 hours as needed for Anxiety 2/8/16  Yes Yeyo Macario MD   folic acid (FOLVITE) 1 MG tablet Take 1 mg by mouth daily   Yes Historical Provider, MD   furosemide (LASIX) 40 MG tablet Take 40 mg by mouth daily    Yes Historical Provider, MD   FLUoxetine (PROZAC) 20 MG capsule Take 20 mg by mouth daily   Yes Historical Provider, MD   potassium chloride (MICRO-K) 10 MEQ CR capsule Take 10 mEq by mouth daily    Yes Historical Provider, MD   levothyroxine (SYNTHROID) 137 MCG tablet Take 137 mcg by mouth Daily    Yes Historical Provider, MD   artificial tears 0.1-0.3 % SOLN ophthalmic solution 2 drops 3 times daily as needed   Yes Historical Provider, MD   etanercept (ENBREL) 50 MG/ML injection erythematous area to the dorsal aspect of the left hand approximately 5 cm in diameter. Negative for any crepitus or lymphangitis. Neurologic:  Neurovascularly intact without any focal sensory/motor deficits. Cranial nerves: II-XII intact, grossly non-focal.  Psychiatric:  Alert and oriented, thought content appropriate, normal insight  Capillary Refill: Brisk,< 3 seconds   Peripheral Pulses: +2 palpable, equal bilaterally       Labs:     Recent Labs     06/20/19  1716   WBC 15.6*   HGB 14.0   HCT 43.4   *     Recent Labs     06/20/19  1716   *   K 4.9   CL 95*   CO2 22   BUN 49*   CREATININE 2.4*   CALCIUM 9.3     Recent Labs     06/20/19  1716   AST 14*   ALT 6*   BILITOT 0.4   ALKPHOS 97     No results for input(s): INR in the last 72 hours. Recent Labs     06/20/19 1716   TROPONINI 0.25*       Urinalysis:      Lab Results   Component Value Date    NITRU Negative 06/20/2019    WBCUA 33 06/20/2019    RBCUA 0-2 06/20/2019    BLOODU Negative 06/20/2019    SPECGRAV 1.023 06/20/2019    GLUCOSEU Negative 06/20/2019       Radiology:     CXR: I have reviewed the CXR with the following interpretation:   EKG:  I have reviewed the EKG with the following interpretation:     XR CHEST STANDARD (2 VW)   Final Result   Findings most compatible with worsening or recurrent pulmonary edema. Correlate with any clinical evidence of superimposed infection.              ASSESSMENT:    Active Hospital Problems    Diagnosis Date Noted    [facility]STEM[facility] (non-ST elevated [facility] infarction) [facility] [I21.4] 06/20/2019    Cellulitis of left upper extremity [L03.114] 06/20/2019    Acute cystitis without hematuria [N30.00] 06/20/2019    Leukocytosis [D72.829] 06/20/2019    CHF (congestive heart failure), NYHA class I, acute on chronic, combined ([facility] [I50.43] 06/20/2019    CKD (chronic kidney disease) stage 2, GFR 60-89 ml/min [N18.2] 11/04/2015         PLAN:    CHF:   -A [facility]D [facility]TA) shows an EF of

## 2019-06-21 NOTE — PROGRESS NOTES
Patient and family states patient takes acetylcysteine ophthalmic eye drops 3 times daily. Per family its a compound eye drop that must be refrigerated. Patient brought in home dose. Medication not ordered. MAXI Callahan NP. Ophthalmic drops deferred to day shift MD per Laura Callahan NP. Patients home dose placed in medication fridge.

## 2019-06-21 NOTE — CARE COORDINATION
INITIAL CASE MANAGEMENT ASSESSMENT    Reviewed chart, met with patient's family (patient out of room for test)  to assess possible discharge needs. Explained Case Management role/services. Living Situation: Patient lives with spouse and son. Spouse current patient at [facility] to dc [facility] today. ADLs: independent      DME: uses a cane    PT/OT Recs: not ordered as of yet     Active Services: none     Transportation:  Family can assist     Medications: can afford and takes as prescribed. PCP: Amando Lopez        PLAN/COMMENTS: Per family, patient plans to return home at [place] and most likely will not need any services. SW/CM provided contact information for patient or family to call with any questions. SW/CM will follow and assist as needed.     Electronically signed by Mukesh Mc on 6/21/2019 at 4:28 PM

## 2019-06-22 ENCOUNTER — APPOINTMENT (OUTPATIENT)
Dept: CT IMAGING | Age: 79
DRG: 871 | End: 2019-06-22
Payer: MEDICARE

## 2019-06-22 ENCOUNTER — APPOINTMENT (OUTPATIENT)
Dept: GENERAL RADIOLOGY | Age: 79
DRG: 871 | End: 2019-06-22
Payer: MEDICARE

## 2019-06-22 LAB
ALBUMIN SERPL-MCNC: 2.4 G/DL (ref 3.4–5)
ALP BLD-CCNC: 66 U/L (ref 40–129)
ALT SERPL-CCNC: 6 U/L (ref 10–40)
ANION GAP SERPL CALCULATED.3IONS-SCNC: 17 MMOL/L (ref 3–16)
AST SERPL-CCNC: 14 U/L (ref 15–37)
BILIRUB SERPL-MCNC: 0.3 MG/DL (ref 0–1)
BILIRUBIN DIRECT: <0.2 MG/DL (ref 0–0.3)
BILIRUBIN, INDIRECT: ABNORMAL MG/DL (ref 0–1)
BUN BLDV-MCNC: 57 MG/DL (ref 7–20)
CALCIUM SERPL-MCNC: 8.3 MG/DL (ref 8.3–10.6)
CHLORIDE BLD-SCNC: 99 MMOL/L (ref 99–110)
CO2: 17 MMOL/L (ref 21–32)
CREAT SERPL-MCNC: 2.3 MG/DL (ref 0.6–1.2)
GFR AFRICAN AMERICAN: 25
GFR NON-AFRICAN AMERICAN: 20
GLUCOSE BLD-MCNC: 103 MG/DL (ref 70–99)
GLUCOSE BLD-MCNC: 105 MG/DL (ref 70–99)
HCT VFR BLD CALC: 39.2 % (ref 36–48)
HEMOGLOBIN: 12.4 G/DL (ref 12–16)
MAGNESIUM: 2.2 MG/DL (ref 1.8–2.4)
MCH RBC QN AUTO: 28.3 PG (ref 26–34)
MCHC RBC AUTO-ENTMCNC: 31.7 G/DL (ref 31–36)
MCV RBC AUTO: 89.3 FL (ref 80–100)
ORGANISM: ABNORMAL
PDW BLD-RTO: 19 % (ref 12.4–15.4)
PERFORMED ON: ABNORMAL
PHOSPHORUS: 4.6 MG/DL (ref 2.5–4.9)
PLATELET # BLD: 591 K/UL (ref 135–450)
PMV BLD AUTO: 7.5 FL (ref 5–10.5)
POTASSIUM SERPL-SCNC: 4.3 MMOL/L (ref 3.5–5.1)
PRO-BNP: ABNORMAL PG/ML (ref 0–449)
PROCALCITONIN: 1.35 NG/ML (ref 0–0.15)
RBC # BLD: 4.39 M/UL (ref 4–5.2)
SODIUM BLD-SCNC: 133 MMOL/L (ref 136–145)
TOTAL CK: 47 U/L (ref 26–192)
TOTAL PROTEIN: 6.1 G/DL (ref 6.4–8.2)
URINE CULTURE, ROUTINE: ABNORMAL
URINE CULTURE, ROUTINE: ABNORMAL
WBC # BLD: 24.6 K/UL (ref 4–11)

## 2019-06-22 PROCEDURE — 94150 VITAL CAPACITY TEST: CPT

## 2019-06-22 PROCEDURE — 80048 BASIC METABOLIC PNL TOTAL CA: CPT

## 2019-06-22 PROCEDURE — 94664 DEMO&/EVAL PT USE INHALER: CPT

## 2019-06-22 PROCEDURE — 71045 X-RAY EXAM CHEST 1 VIEW: CPT

## 2019-06-22 PROCEDURE — 36415 COLL VENOUS BLD VENIPUNCTURE: CPT

## 2019-06-22 PROCEDURE — 94760 N-INVAS EAR/PLS OXIMETRY 1: CPT

## 2019-06-22 PROCEDURE — 6360000002 HC RX W HCPCS: Performed by: INTERNAL MEDICINE

## 2019-06-22 PROCEDURE — 6370000000 HC RX 637 (ALT 250 FOR IP): Performed by: NURSE PRACTITIONER

## 2019-06-22 PROCEDURE — 94667 MNPJ CHEST WALL 1ST: CPT

## 2019-06-22 PROCEDURE — 2580000003 HC RX 258: Performed by: INTERNAL MEDICINE

## 2019-06-22 PROCEDURE — 84100 ASSAY OF PHOSPHORUS: CPT

## 2019-06-22 PROCEDURE — 85027 COMPLETE CBC AUTOMATED: CPT

## 2019-06-22 PROCEDURE — 83735 ASSAY OF MAGNESIUM: CPT

## 2019-06-22 PROCEDURE — 80076 HEPATIC FUNCTION PANEL: CPT

## 2019-06-22 PROCEDURE — 83880 ASSAY OF NATRIURETIC PEPTIDE: CPT

## 2019-06-22 PROCEDURE — 2060000000 HC ICU INTERMEDIATE R&B

## 2019-06-22 PROCEDURE — 2700000000 HC OXYGEN THERAPY PER DAY

## 2019-06-22 PROCEDURE — 84145 PROCALCITONIN (PCT): CPT

## 2019-06-22 PROCEDURE — 2580000003 HC RX 258: Performed by: NURSE PRACTITIONER

## 2019-06-22 PROCEDURE — 99232 SBSQ HOSP IP/OBS MODERATE 35: CPT | Performed by: NURSE PRACTITIONER

## 2019-06-22 PROCEDURE — 74176 CT ABD & PELVIS W/O CONTRAST: CPT

## 2019-06-22 PROCEDURE — 6370000000 HC RX 637 (ALT 250 FOR IP): Performed by: INTERNAL MEDICINE

## 2019-06-22 PROCEDURE — 94761 N-INVAS EAR/PLS OXIMETRY MLT: CPT

## 2019-06-22 PROCEDURE — 82550 ASSAY OF CK (CPK): CPT

## 2019-06-22 PROCEDURE — 6360000002 HC RX W HCPCS: Performed by: NURSE PRACTITIONER

## 2019-06-22 RX ORDER — GUAIFENESIN 600 MG/1
600 TABLET, EXTENDED RELEASE ORAL 2 TIMES DAILY
Status: DISCONTINUED | OUTPATIENT
Start: 2019-06-22 | End: 2019-07-02 | Stop reason: HOSPADM

## 2019-06-22 RX ORDER — FUROSEMIDE 10 MG/ML
20 INJECTION INTRAMUSCULAR; INTRAVENOUS ONCE
Status: COMPLETED | OUTPATIENT
Start: 2019-06-22 | End: 2019-06-22

## 2019-06-22 RX ADMIN — PIPERACILLIN SODIUM,TAZOBACTAM SODIUM 3.38 G: 3; .375 INJECTION, POWDER, FOR SOLUTION INTRAVENOUS at 19:02

## 2019-06-22 RX ADMIN — Medication 10 ML: at 10:06

## 2019-06-22 RX ADMIN — PREDNISOLONE ACETATE 1 DROP: 1.2 SUSPENSION/ DROPS OPHTHALMIC at 22:19

## 2019-06-22 RX ADMIN — FUROSEMIDE 20 MG: 10 INJECTION, SOLUTION INTRAMUSCULAR; INTRAVENOUS at 13:49

## 2019-06-22 RX ADMIN — GUAIFENESIN 600 MG: 600 TABLET, EXTENDED RELEASE ORAL at 22:03

## 2019-06-22 RX ADMIN — PREDNISOLONE ACETATE 1 DROP: 1.2 SUSPENSION/ DROPS OPHTHALMIC at 13:00

## 2019-06-22 RX ADMIN — FLUOXETINE 20 MG: 20 CAPSULE ORAL at 10:05

## 2019-06-22 RX ADMIN — Medication 10 ML: at 22:19

## 2019-06-22 RX ADMIN — HEPARIN SODIUM 5000 UNITS: 5000 INJECTION INTRAVENOUS; SUBCUTANEOUS at 05:45

## 2019-06-22 RX ADMIN — LEVOTHYROXINE SODIUM 137 MCG: 25 TABLET ORAL at 05:45

## 2019-06-22 RX ADMIN — ONDANSETRON 4 MG: 2 INJECTION INTRAMUSCULAR; INTRAVENOUS at 10:05

## 2019-06-22 RX ADMIN — HEPARIN SODIUM 5000 UNITS: 5000 INJECTION INTRAVENOUS; SUBCUTANEOUS at 12:59

## 2019-06-22 RX ADMIN — BUPROPION HYDROCHLORIDE 150 MG: 150 TABLET, FILM COATED, EXTENDED RELEASE ORAL at 10:05

## 2019-06-22 RX ADMIN — POTASSIUM CHLORIDE 10 MEQ: 750 TABLET, FILM COATED, EXTENDED RELEASE ORAL at 10:05

## 2019-06-22 RX ADMIN — PREDNISOLONE ACETATE 1 DROP: 1.2 SUSPENSION/ DROPS OPHTHALMIC at 09:00

## 2019-06-22 RX ADMIN — HEPARIN SODIUM 5000 UNITS: 5000 INJECTION INTRAVENOUS; SUBCUTANEOUS at 22:04

## 2019-06-22 RX ADMIN — FOLIC ACID 1 MG: 1 TABLET ORAL at 10:05

## 2019-06-22 RX ADMIN — ROPINIROLE HYDROCHLORIDE 0.25 MG: 0.5 TABLET, FILM COATED ORAL at 22:03

## 2019-06-22 RX ADMIN — SODIUM CHLORIDE: 9 INJECTION, SOLUTION INTRAVENOUS at 06:06

## 2019-06-22 RX ADMIN — PREDNISONE 5 MG: 5 TABLET ORAL at 10:05

## 2019-06-22 NOTE — PROGRESS NOTES
Nephrology Progress Note  [phone]   [email or web address]    Patient:  Lavonda Barthel   : 1940    CC:  NOBLE    Subjective:  She is very somnolent today ,looks like her breathing is comfortable, CXR suggest pulmonary edema, Cr is stable but no better. Renal U/S unremarkable. ROS:   Denies dyspnea, no pain     SHx:  No visiotrs     Meds:  Reviewed     Vitals:  BP (!) 98/59   Pulse 85   Temp 97.7 °F (36.5 °C) (Oral)   Resp 16   Ht 5' 5\" (1.651 m)   Wt 178 lb 12.7 oz (81.1 kg)   SpO2 92%   BMI 29.75 kg/m²     Physical Exam:  Gen: Resting in bed, NAD. HEENT: MMM, OP clear. CV: RRR no rub noted   Lungs: good respiratory effort and clear air entry   Abd: S/NT +BS  Ext: No edema, no cyanosis  Skin: Warm. No rashes appreciated. Labs:  CBC:   Lab Results   Component Value Date    WBC 24.6 2019    RBC 4.39 2019    RBC 3.81 2017    HGB 12.4 2019    HCT 39.2 2019    MCV 89.3 2019    MCH 28.3 2019    MCHC 31.7 2019    RDW 19.0 2019     2019    MPV 7.5 2019     BMP:    Lab Results   Component Value Date     2019    K 4.3 2019    K 4.9 2019    CL 99 2019    CO2 17 2019    BUN 57 2019    LABALBU 2.4 2019    CREATININE 2.3 2019    CALCIUM 8.3 2019    GFRAA 25 2019    LABGLOM 20 2019    GLUCOSE 105 2019    GLUCOSE 97 2017       Assessment/Plan:  1. NOBLE no better,will need to stop fluids   2. Agree with Lasix as needed day to day for CDHF   3. ? CKD  we don't  have recent baseline suspect some degree of CKD as noted   4.  Serial labs       Garland Nunes MD

## 2019-06-22 NOTE — PROGRESS NOTES
edema  Skin: Skin color, texture, turgor normal.  Patient has an erythematous area to the dorsal aspect of the left hand approximately 5 cm in diameter. Negative for any crepitus or lymphangitis. Neurologic:  Neurovascularly intact without any focal sensory/motor deficits. Cranial nerves: II-XII intact, grossly non-focal.  Psychiatric:  sleeping  Capillary Refill: Brisk,< 3 seconds   Peripheral Pulses: +2 palpable, equal bilaterally    Labs:   Recent Labs     06/20/19 1716 06/21/19 0617 06/22/19  0430   WBC 15.6* 20.2* 24.6*   HGB 14.0 12.2 12.4   HCT 43.4 38.1 39.2   * 636* 591*     Recent Labs     06/21/19 0617 06/21/19  0830 06/22/19  0430   * 131* 133*   K 4.2 4.3 4.3   CL 98* 95* 99   CO2 20* 19* 17*   BUN 52* 51* 57*   CREATININE 2.3* 2.2* 2.3*   CALCIUM 8.4 8.6 8.3   PHOS  --   --  4.6     Recent Labs     06/20/19 1716 06/22/19  0430   AST 14* 14*   ALT 6* 6*   BILIDIR  --  <0.2   BILITOT 0.4 0.3   ALKPHOS 97 66     No results for input(s): INR in the last 72 hours. Recent Labs     06/20/19  1716 06/20/19  2311 06/21/19  0316 06/22/19  0430   CKTOTAL  --   --   --  47   TROPONINI 0.25* 0.27* 0.28*  --        Urinalysis:      Lab Results   Component Value Date    NITRU Negative 06/20/2019    WBCUA 33 06/20/2019    RBCUA 0-2 06/20/2019    BLOODU Negative 06/20/2019    SPECGRAV 1.023 06/20/2019    GLUCOSEU Negative 06/20/2019       Radiology:  XR CHEST PORTABLE   Final Result   Cardiomegaly with radiographic worsening of pulmonary edema and persistent   left larger than right pleural effusions         US RENAL COMPLETE   Final Result   Mild diffuse increased echogenicity of the kidneys consistent with medical   renal disease. CT CHEST WO CONTRAST   Final Result   1. Interstitial pulmonary edema, moderate to large bilateral pleural   effusions, and mild cardiomegaly, suggesting congestive heart failure. 2. Passive atelectasis in the bilateral lower lobes.   Superimposed pneumonia   or aspiration may be present bilaterally. 3. Findings suggestive of pulmonary hypertension. 4. Perivascular stranding adjacent to the splenic hilum, an indeterminate   finding that could be related to underlying splenic vein thrombosis. Consider further evaluation with sonography or a contrast-enhanced abdomen   and pelvis CT. 5. Trace pericardial effusion and mild anasarca. XR CHEST STANDARD (2 VW)   Final Result   Findings most compatible with worsening or recurrent pulmonary edema. Correlate with any clinical evidence of superimposed infection. Assessment/Plan:    Active Hospital Problems    Diagnosis    Atrial tachycardia ([address] [I47.1]    NOBLE (acute kidney injury) ([facility] [N17.9]    PSVT (paroxysmal supraventricular tachycardia) ([facility]) [I47.1]    Demand ischemia ([address] [I24.8]    Chronic diastolic heart failure due to hypertrophic obstructive cardiomyopathy ([address] [I50.32, I42.1]    Acute respiratory failure with hypoxia ([facility]) [J96.01]    Aortic valve stenosis [I35.0]    NSTEMI (non-ST elevated myocardial infarction) ([facility] [I21.4]    Cellulitis of left upper extremity [L03.114]    Acute cystitis without hematuria [N30.00]    Leukocytosis [D72.829]    CHF (congestive heart failure), NYHA class I, acute on chronic, combined ([facility]) [I50.43]    CKD (chronic kidney disease) stage 2, GFR 60-89 ml/min [N18.2]         CHF: acute diastolic   -A 2D Echocardiogram on March 26, 2019 (Psych[facility]) shows an EF of 55-60%, X-ray shows pulmonary edema, BMP 52,849  -  Monitor strict I&Os and daily weights.   - Cardiology has been consulted.   - The CHF nurse has been consulted to discuss disease management   - recheck cxr with pleural effusion, hold IVF and give lasix     UTI:  -Rocephin started-->  - UA cultures reviewed  -Supportive care     NSTEMI:  -Suspect secondary to chronic kidney disease and CHF  -Initial troponin 0.25-->0.28  -Continue to trend the troponin  -Cardiology has been consulted  -no further cardiac intervention per cardio     Cellulitis of left hand:  -Patient has no known mechanism of injury  -White count elevated 15-->24  -IV Rocephin  -Continue to trend white blood cell count     Leukocytosis:  -Suspect secondary to cellulitis of left hand and UTI  -Continue to trend up, check CT abd     ARF on Chronic kidney disease stage II:  -Baseline BUN 22-24 today 49  -Baseline creatinine 1.1- 1.3, Today 2.4-->2.2-->2.3  -Nephrology consulted, received IVF 6/21, d/graciela IVF today  - monitor creat          DVT Prophylaxis: heparin  Diet: DIET LOW SODIUM 2 GM;  Code Status: Full Code    PT/OT Eval Status: ordered    Jessica Hidalgo MD

## 2019-06-22 NOTE — SIGNIFICANT EVENT
Rapid response team called  Patient tiered with decrease breath sounds  Patient is awake, but feels tired, opens her eyes, follow commands, denies chest pain, SOB, palpitation, headache, nausea or vomiting.   Lungs with some rhonchi  No murmur  Abdomen soft  alert and oriented x 3  Chest xray ordered  Primary team will continue to monitor and follow up

## 2019-06-23 LAB
ANION GAP SERPL CALCULATED.3IONS-SCNC: 15 MMOL/L (ref 3–16)
BUN BLDV-MCNC: 57 MG/DL (ref 7–20)
CALCIUM SERPL-MCNC: 8.2 MG/DL (ref 8.3–10.6)
CHLORIDE BLD-SCNC: 103 MMOL/L (ref 99–110)
CO2: 19 MMOL/L (ref 21–32)
CREAT SERPL-MCNC: 2.1 MG/DL (ref 0.6–1.2)
GFR AFRICAN AMERICAN: 27
GFR NON-AFRICAN AMERICAN: 23
GLUCOSE BLD-MCNC: 94 MG/DL (ref 70–99)
MAGNESIUM: 2.5 MG/DL (ref 1.8–2.4)
POTASSIUM SERPL-SCNC: 4.7 MMOL/L (ref 3.5–5.1)
PROCALCITONIN: 1.19 NG/ML (ref 0–0.15)
SODIUM BLD-SCNC: 137 MMOL/L (ref 136–145)

## 2019-06-23 PROCEDURE — 94669 MECHANICAL CHEST WALL OSCILL: CPT

## 2019-06-23 PROCEDURE — 6360000002 HC RX W HCPCS: Performed by: NURSE PRACTITIONER

## 2019-06-23 PROCEDURE — 83735 ASSAY OF MAGNESIUM: CPT

## 2019-06-23 PROCEDURE — 94761 N-INVAS EAR/PLS OXIMETRY MLT: CPT

## 2019-06-23 PROCEDURE — 36415 COLL VENOUS BLD VENIPUNCTURE: CPT

## 2019-06-23 PROCEDURE — 6370000000 HC RX 637 (ALT 250 FOR IP): Performed by: NURSE PRACTITIONER

## 2019-06-23 PROCEDURE — 6360000002 HC RX W HCPCS: Performed by: INTERNAL MEDICINE

## 2019-06-23 PROCEDURE — 2580000003 HC RX 258: Performed by: NURSE PRACTITIONER

## 2019-06-23 PROCEDURE — 84145 PROCALCITONIN (PCT): CPT

## 2019-06-23 PROCEDURE — 99232 SBSQ HOSP IP/OBS MODERATE 35: CPT | Performed by: NURSE PRACTITIONER

## 2019-06-23 PROCEDURE — 2060000000 HC ICU INTERMEDIATE R&B

## 2019-06-23 PROCEDURE — 2580000003 HC RX 258: Performed by: INTERNAL MEDICINE

## 2019-06-23 PROCEDURE — 80048 BASIC METABOLIC PNL TOTAL CA: CPT

## 2019-06-23 PROCEDURE — 6370000000 HC RX 637 (ALT 250 FOR IP): Performed by: INTERNAL MEDICINE

## 2019-06-23 PROCEDURE — 2700000000 HC OXYGEN THERAPY PER DAY

## 2019-06-23 RX ORDER — FUROSEMIDE 10 MG/ML
20 INJECTION INTRAMUSCULAR; INTRAVENOUS ONCE
Status: DISCONTINUED | OUTPATIENT
Start: 2019-06-23 | End: 2019-06-23

## 2019-06-23 RX ORDER — FUROSEMIDE 10 MG/ML
20 INJECTION INTRAMUSCULAR; INTRAVENOUS 2 TIMES DAILY
Status: COMPLETED | OUTPATIENT
Start: 2019-06-23 | End: 2019-06-23

## 2019-06-23 RX ORDER — FUROSEMIDE 10 MG/ML
20 INJECTION INTRAMUSCULAR; INTRAVENOUS 2 TIMES DAILY
Status: DISCONTINUED | OUTPATIENT
Start: 2019-06-24 | End: 2019-06-24

## 2019-06-23 RX ADMIN — METOPROLOL TARTRATE 12.5 MG: 25 TABLET ORAL at 21:31

## 2019-06-23 RX ADMIN — ROPINIROLE HYDROCHLORIDE 0.25 MG: 0.5 TABLET, FILM COATED ORAL at 21:31

## 2019-06-23 RX ADMIN — PIPERACILLIN SODIUM,TAZOBACTAM SODIUM 3.38 G: 3; .375 INJECTION, POWDER, FOR SOLUTION INTRAVENOUS at 17:41

## 2019-06-23 RX ADMIN — HEPARIN SODIUM 5000 UNITS: 5000 INJECTION INTRAVENOUS; SUBCUTANEOUS at 21:32

## 2019-06-23 RX ADMIN — POTASSIUM CHLORIDE 10 MEQ: 750 TABLET, FILM COATED, EXTENDED RELEASE ORAL at 09:03

## 2019-06-23 RX ADMIN — FOLIC ACID 1 MG: 1 TABLET ORAL at 09:03

## 2019-06-23 RX ADMIN — BUPROPION HYDROCHLORIDE 150 MG: 150 TABLET, FILM COATED, EXTENDED RELEASE ORAL at 09:02

## 2019-06-23 RX ADMIN — AMITRIPTYLINE HYDROCHLORIDE 10 MG: 10 TABLET, FILM COATED ORAL at 21:31

## 2019-06-23 RX ADMIN — PIPERACILLIN SODIUM,TAZOBACTAM SODIUM 3.38 G: 3; .375 INJECTION, POWDER, FOR SOLUTION INTRAVENOUS at 09:48

## 2019-06-23 RX ADMIN — Medication 10 ML: at 09:03

## 2019-06-23 RX ADMIN — FLUOXETINE 20 MG: 20 CAPSULE ORAL at 09:03

## 2019-06-23 RX ADMIN — Medication 10 ML: at 21:34

## 2019-06-23 RX ADMIN — GUAIFENESIN 600 MG: 600 TABLET, EXTENDED RELEASE ORAL at 21:31

## 2019-06-23 RX ADMIN — GUAIFENESIN 600 MG: 600 TABLET, EXTENDED RELEASE ORAL at 09:02

## 2019-06-23 RX ADMIN — FUROSEMIDE 20 MG: 10 INJECTION, SOLUTION INTRAMUSCULAR; INTRAVENOUS at 17:46

## 2019-06-23 RX ADMIN — PREDNISONE 5 MG: 5 TABLET ORAL at 09:03

## 2019-06-23 RX ADMIN — METOPROLOL TARTRATE 12.5 MG: 25 TABLET ORAL at 12:47

## 2019-06-23 RX ADMIN — HEPARIN SODIUM 5000 UNITS: 5000 INJECTION INTRAVENOUS; SUBCUTANEOUS at 14:28

## 2019-06-23 RX ADMIN — PREDNISOLONE ACETATE 1 DROP: 1.2 SUSPENSION/ DROPS OPHTHALMIC at 14:28

## 2019-06-23 RX ADMIN — HEPARIN SODIUM 5000 UNITS: 5000 INJECTION INTRAVENOUS; SUBCUTANEOUS at 06:13

## 2019-06-23 RX ADMIN — PREDNISOLONE ACETATE 1 DROP: 1.2 SUSPENSION/ DROPS OPHTHALMIC at 21:32

## 2019-06-23 RX ADMIN — FUROSEMIDE 20 MG: 10 INJECTION, SOLUTION INTRAMUSCULAR; INTRAVENOUS at 11:30

## 2019-06-23 RX ADMIN — PIPERACILLIN SODIUM,TAZOBACTAM SODIUM 3.38 G: 3; .375 INJECTION, POWDER, FOR SOLUTION INTRAVENOUS at 04:14

## 2019-06-23 RX ADMIN — LEVOTHYROXINE SODIUM 137 MCG: 25 TABLET ORAL at 06:13

## 2019-06-23 RX ADMIN — AMITRIPTYLINE HYDROCHLORIDE 10 MG: 10 TABLET, FILM COATED ORAL at 00:23

## 2019-06-23 RX ADMIN — PREDNISOLONE ACETATE 1 DROP: 1.2 SUSPENSION/ DROPS OPHTHALMIC at 09:04

## 2019-06-23 ASSESSMENT — PAIN SCALES - GENERAL
PAINLEVEL_OUTOF10: 0

## 2019-06-23 NOTE — PROGRESS NOTES
No issues on this shift. Per daughters, patient usually sleeps during the daytime. They reported that at times patient is hard to arouse especially in the morning. Patient was more alert during the evening and night. Voiding per BR and bedpan.

## 2019-06-23 NOTE — PROGRESS NOTES
Cardiology - PROGRESS NOTE    Admit Date: 6/20/2019     Reason for follow up: CHF       [de-identified] y.o. patient who presented to the hospital with several vague complaints but generally felt unwell and had diffuse whole body pains starting the day prior to admission. She has rheumatoid arthritis but says the sensation is much different than her typically joint pains. She denies fevers/chills but appears to have a UTI. She also has many lab abnormalities including NOBLE and an elevated troponin. She has CML but says she is in \"remission\" and takes a maintainence oral chemotherapy        Social History:   reports that she has quit smoking. Her smoking use included cigarettes. She does not have any smokeless tobacco history on file. She reports that she does not drink alcohol or use drugs. Family History: family history includes Alzheimer's Disease in her sister; Arthritis in her father; Cancer in her mother and sister; Dementia in her sister; Diabetes in her brother. Interval History:  Patient seen and examined and notes reviewed     RR called this AM   Patient sleepy however awakens easily  Reports fatigue and poor appetite   Denies chest pain or palpitations  Denies shortness of breath however states she is \"just laying here not doing anything\"     CR  2.3   All other systems reviewed and negative except as above. Diet: DIET LOW SODIUM 2 GM; 1800 ml  Pain is:Mild  Nausea:Mild    In: 450 [P.O.:240;  I.V.:10]  Out: 932    Wt Readings from Last 3 Encounters:   06/23/19 174 lb 9.7 oz (79.2 kg)   07/25/17 176 lb (79.8 kg)   07/17/17 175 lb (79.4 kg)           Data:   Scheduled Meds:   Scheduled Meds:   furosemide  20 mg Intravenous BID    piperacillin-tazobactam  3.375 g Intravenous Q8H    guaiFENesin  600 mg Oral BID    heparin (porcine)  5,000 Units Subcutaneous 3 times per day    sodium chloride flush  10 mL Intravenous 2 times per day    amitriptyline 10 mg Oral Nightly    buPROPion  150 mg Oral Daily    dasatinib  50 mg Oral Daily    FLUoxetine  20 mg Oral Daily    folic acid  1 mg Oral Daily    levothyroxine  137 mcg Oral Daily    potassium chloride  10 mEq Oral Daily    prednisoLONE acetate  1 drop Both Eyes TID    predniSONE  5 mg Oral Daily    rOPINIRole  0.25 mg Oral Nightly     Continuous Infusions:    PRN Meds:. HYDROcodone-acetaminophen, metoprolol, sodium chloride flush, ondansetron, acetaminophen, diazepam, polyethyl glycol-propyl glycol 0.4-0.3 %  Continuous Infusions:      Intake/Output Summary (Last 24 hours) at 6/23/2019 1136  Last data filed at 6/23/2019 0848  Gross per 24 hour   Intake 450 ml   Output 932 ml   Net -482 ml       CBC:   Recent Labs     06/22/19  0430   WBC 24.6*   HGB 12.4   *     BMP:  Recent Labs     06/23/19  0503      K 4.7      CO2 19*   BUN 57*   CREATININE 2.1*   GLUCOSE 94     ABGs: No results found for: PHART, PO2ART, TOW9RSY  INR: No results for input(s): INR in the last 72 hours. CARDIAC LABS     ENZYMES:  Recent Labs     06/20/19  1716 06/20/19  2311 06/21/19  0316   TROPONINI 0.25* 0.27* 0.28*     FASTING LIPID PANEL:  Lab Results   Component Value Date    HDL 23 06/21/2019    LDLCALC 69 06/21/2019    TRIG 109 06/21/2019     LIVER PROFILE:  Recent Labs     06/20/19  1716 06/22/19  0430   AST 14* 14*   ALT 6* 6*       -----------------------------------------------------------------  Telemetry: personally reviewed   SR with occasional PAC   RAD:     CT Chest:  1. Interstitial pulmonary edema, moderate to large bilateral pleural   effusions, and mild cardiomegaly, suggesting congestive heart failure. 2. Passive atelectasis in the bilateral lower lobes.  Superimposed pneumonia   or aspiration may be present bilaterally. 3. Findings suggestive of pulmonary hypertension.    4. Perivascular stranding adjacent to the splenic hilum, an indeterminate   finding that could be related to underlying splenic vein thrombosis. Consider further evaluation with sonography or a contrast-enhanced abdomen   and pelvis CT. 5. Trace pericardial effusion and mild anasarca. EK19    Echo: 3/2019  - Left ventricle: The cavity size was normal. Wall thickness was normal. Systolic function was normal. The calculated ejection fraction was in the range of 55% to 60%. Wall motion was normal; there were no regional wall motion abnormalities. Doppler parameters are consistent with abnormal left ventricular relaxation (grade 1 diastolic dysfunction). Stroke volume: 66ml.  Stroke volume/bsa: 36ml/m^2. - Aortic valve: Moderate focal thickening and calcification. Cusp separation was moderately reduced. There was moderate stenosis. There was trivial regurgitation. Mean gradient (S): 14mm Hg. Peak gradient (S): 26mm Hg. Valve area (VTI): 1.2cm^2. Indexed valve area (VTI): 0.66cm^2/m^2. Valve area (Vmax): 1.25cm^2. Indexed valve area (Vmax): 0.69cm^2/m^2. - Aorta: Aortic root dimension: 3.9cm (ED). Ascending aortic diameter: 4.4cm (S).  - Mitral valve: The annulus was mildly calcified. Mild thickening and calcification of the posterior leaflet. - Left atrium: The atrium was moderately dilated. - Right ventricle: The cavity size was mildly dilated. Wall thickness was normal.  - Right atrium: The atrium was markedly dilated. - Inferior vena cava: The vessel was dilated; the respirophasic diameter changes were in the normal range (>= 50%); findings are consistent with mildly elevated central venous pressure. - Pulmonary arteries: PA peak pressure: 56mm Hg (S). Objective:   Vitals: /69   Pulse 73   Temp 97.5 °F (36.4 °C) (Axillary)   Resp 18   Ht 5' 5\" (1.651 m)   Wt 174 lb 9.7 oz (79.2 kg)   SpO2 95%   BMI 29.06 kg/m²   General appearance: alert, appears stated age and cooperative, No acute distress   Skin: Skin color, texture, turgor normal. No rashes or ecchymosis.   HEENT: Head: Normocephalic, no lesions, without obvious abnormality. Neck: JVD + 12   Lungs: SARWAT/LLL-diminished and distant ,   Heart: s1s2 RRR, III/VI AUTUMN   Abdomen: soft, non-tender; bowel sounds normal; no masses,  no organomegaly  Extremities: edema +1, pulses: DP +2/+2, PT +2/+2  Neurologic: Mental status: Alert, oriented, thought content appropriate, no tremors, no gross sensory motor deficit,   Psychiatric: normal insight and affect      Assessment & Plan:    Patient Active Problem List:     CML (chronic myelocytic leukemia) (HCC)     Rheumatoid arthritis of multiple sites with negative rheumatoid factor (HCC)     Acquired hypothyroidism     History of CHF (congestive heart failure)     Chronic idiopathic gout of multiple sites     Anxiety and depression     CKD (chronic kidney disease) stage 2, GFR 60-89 ml/min     Chronic pain syndrome     High risk medication use     NSTEMI (non-ST elevated myocardial infarction) ([facility])     Cellulitis of left upper extremity     Acute cystitis without hematuria     Leukocytosis     CHF (congestive heart failure), NYHA class I, acute on chronic, combined (HCC)     PSVT (paroxysmal supraventricular tachycardia) ([facility])     Demand ischemia (HCC)     Chronic diastolic heart failure due to hypertrophic obstructive cardiomyopathy (HCC)     Acute respiratory failure with hypoxia (HCC)     Nonrheumatic aortic valve stenosis        Plan:  1. Atrial Tachycardia   No additional episodes this AM     Remains in SR    Will add low dose beta-blocker therapy   2. Heart failure    Acute on chronic diastolic    Remains fluid overloaded    Tolerated IV lasix yesterday    Will increase to BID today and will need to reassess dosage in AM    Will add low dose short acting beta-blocker therapy with hold parameters    No ACE/ARB/aldactone secondary to renal fx   Fluid and Na restrictions    Compression stockings   3.  NOBLE on CKD    Creatinine stable   Avoid nephrotoxic agents    Gentle diuresis    Renal

## 2019-06-24 ENCOUNTER — APPOINTMENT (OUTPATIENT)
Dept: GENERAL RADIOLOGY | Age: 79
DRG: 871 | End: 2019-06-24
Payer: MEDICARE

## 2019-06-24 LAB
ANION GAP SERPL CALCULATED.3IONS-SCNC: 17 MMOL/L (ref 3–16)
BASE EXCESS ARTERIAL: -3 (ref -3–3)
BASE EXCESS ARTERIAL: -4.7 MMOL/L (ref -3–3)
BASE EXCESS ARTERIAL: -5.1 MMOL/L (ref -3–3)
BASE EXCESS ARTERIAL: -6 (ref -3–3)
BASOPHILS ABSOLUTE: 0.1 K/UL (ref 0–0.2)
BASOPHILS ABSOLUTE: 0.1 K/UL (ref 0–0.2)
BASOPHILS RELATIVE PERCENT: 0.5 %
BASOPHILS RELATIVE PERCENT: 0.7 %
BUN BLDV-MCNC: 52 MG/DL (ref 7–20)
CALCIUM IONIZED: 1.2 MMOL/L (ref 1.12–1.32)
CALCIUM IONIZED: 1.3 MMOL/L (ref 1.12–1.32)
CALCIUM SERPL-MCNC: 9 MG/DL (ref 8.3–10.6)
CARBOXYHEMOGLOBIN ARTERIAL: 0.9 % (ref 0–1.5)
CARBOXYHEMOGLOBIN ARTERIAL: 0.9 % (ref 0–1.5)
CHLORIDE BLD-SCNC: 97 MMOL/L (ref 99–110)
CO2: 20 MMOL/L (ref 21–32)
CREAT SERPL-MCNC: 2 MG/DL (ref 0.6–1.2)
EOSINOPHILS ABSOLUTE: 0.5 K/UL (ref 0–0.6)
EOSINOPHILS ABSOLUTE: 0.7 K/UL (ref 0–0.6)
EOSINOPHILS RELATIVE PERCENT: 4 %
EOSINOPHILS RELATIVE PERCENT: 4.2 %
GFR AFRICAN AMERICAN: 29
GFR NON-AFRICAN AMERICAN: 24
GLUCOSE BLD-MCNC: 140 MG/DL (ref 70–99)
GLUCOSE BLD-MCNC: 149 MG/DL (ref 70–99)
GLUCOSE BLD-MCNC: 149 MG/DL (ref 70–99)
GLUCOSE BLD-MCNC: 182 MG/DL (ref 70–99)
HCO3 ARTERIAL: 23.1 MMOL/L (ref 21–29)
HCO3 ARTERIAL: 23.9 MMOL/L (ref 21–29)
HCO3 ARTERIAL: 23.9 MMOL/L (ref 21–29)
HCO3 ARTERIAL: 25.2 MMOL/L (ref 21–29)
HCT VFR BLD CALC: 40.3 % (ref 36–48)
HCT VFR BLD CALC: 41.6 % (ref 36–48)
HEMOGLOBIN, ART, EXTENDED: 12.8 G/DL (ref 12–16)
HEMOGLOBIN, ART, EXTENDED: 12.9 G/DL (ref 12–16)
HEMOGLOBIN: 13 G/DL (ref 12–16)
HEMOGLOBIN: 13.2 G/DL (ref 12–16)
HEMOGLOBIN: 19.2 GM/DL (ref 12–16)
LACTATE: 0.53 MMOL/L (ref 0.4–2)
LACTATE: 0.71 MMOL/L (ref 0.4–2)
LYMPHOCYTES ABSOLUTE: 0.2 K/UL (ref 1–5.1)
LYMPHOCYTES ABSOLUTE: 0.6 K/UL (ref 1–5.1)
LYMPHOCYTES RELATIVE PERCENT: 1.5 %
LYMPHOCYTES RELATIVE PERCENT: 3.8 %
MAGNESIUM: 2.5 MG/DL (ref 1.8–2.4)
MCH RBC QN AUTO: 29.4 PG (ref 26–34)
MCH RBC QN AUTO: 29.4 PG (ref 26–34)
MCHC RBC AUTO-ENTMCNC: 31.7 G/DL (ref 31–36)
MCHC RBC AUTO-ENTMCNC: 32.3 G/DL (ref 31–36)
MCV RBC AUTO: 91.1 FL (ref 80–100)
MCV RBC AUTO: 92.9 FL (ref 80–100)
METHEMOGLOBIN ARTERIAL: 0.6 %
METHEMOGLOBIN ARTERIAL: 0.6 %
MONOCYTES ABSOLUTE: 1.1 K/UL (ref 0–1.3)
MONOCYTES ABSOLUTE: 1.6 K/UL (ref 0–1.3)
MONOCYTES RELATIVE PERCENT: 8.8 %
MONOCYTES RELATIVE PERCENT: 9.8 %
MRSA SCREEN RT-PCR: NORMAL
NEUTROPHILS ABSOLUTE: 10.8 K/UL (ref 1.7–7.7)
NEUTROPHILS ABSOLUTE: 13.7 K/UL (ref 1.7–7.7)
NEUTROPHILS RELATIVE PERCENT: 81.7 %
NEUTROPHILS RELATIVE PERCENT: 85 %
O2 CONTENT ARTERIAL: 16 ML/DL
O2 CONTENT ARTERIAL: 18 ML/DL
O2 SAT, ARTERIAL: 81 % (ref 93–100)
O2 SAT, ARTERIAL: 87 % (ref 93–100)
O2 SAT, ARTERIAL: 88.8 %
O2 SAT, ARTERIAL: 97.6 %
O2 THERAPY: ABNORMAL
O2 THERAPY: ABNORMAL
PCO2 ARTERIAL: 62.8 MMHG (ref 35–45)
PCO2 ARTERIAL: 65.1 MMHG (ref 35–45)
PCO2 ARTERIAL: 66.7 MM HG (ref 35–45)
PCO2 ARTERIAL: 67.6 MM HG (ref 35–45)
PDW BLD-RTO: 19.1 % (ref 12.4–15.4)
PDW BLD-RTO: 19.9 % (ref 12.4–15.4)
PERFORMED ON: ABNORMAL
PH ARTERIAL: 7.15 (ref 7.35–7.45)
PH ARTERIAL: 7.18 (ref 7.35–7.45)
PH ARTERIAL: 7.19 (ref 7.35–7.45)
PH ARTERIAL: 7.2 (ref 7.35–7.45)
PLATELET # BLD: 605 K/UL (ref 135–450)
PLATELET # BLD: 674 K/UL (ref 135–450)
PMV BLD AUTO: 7.2 FL (ref 5–10.5)
PMV BLD AUTO: 7.5 FL (ref 5–10.5)
PO2 ARTERIAL: 109 MMHG (ref 75–108)
PO2 ARTERIAL: 57.9 MMHG (ref 75–108)
PO2 ARTERIAL: 59.7 MM HG (ref 75–108)
PO2 ARTERIAL: 68.2 MM HG (ref 75–108)
POC HEMATOCRIT: 56 % (ref 36–48)
POC POTASSIUM: 3.9 MMOL/L (ref 3.5–5.1)
POC POTASSIUM: 4 MMOL/L (ref 3.5–5.1)
POC SAMPLE TYPE: ABNORMAL
POC SAMPLE TYPE: ABNORMAL
POC SODIUM: 134 MMOL/L (ref 136–145)
POC SODIUM: 135 MMOL/L (ref 136–145)
POTASSIUM SERPL-SCNC: 4.7 MMOL/L (ref 3.5–5.1)
PRO-BNP: ABNORMAL PG/ML (ref 0–449)
PROCALCITONIN: 1.03 NG/ML (ref 0–0.15)
RBC # BLD: 4.42 M/UL (ref 4–5.2)
RBC # BLD: 4.48 M/UL (ref 4–5.2)
SODIUM BLD-SCNC: 134 MMOL/L (ref 136–145)
TCO2 ARTERIAL: 25 MMOL/L
TCO2 ARTERIAL: 25.8 MMOL/L
TCO2 ARTERIAL: 25.9 MMOL/L
TCO2 ARTERIAL: 27 MMOL/L
WBC # BLD: 12.7 K/UL (ref 4–11)
WBC # BLD: 16.7 K/UL (ref 4–11)

## 2019-06-24 PROCEDURE — C1751 CATH, INF, PER/CENT/MIDLINE: HCPCS

## 2019-06-24 PROCEDURE — 2580000003 HC RX 258: Performed by: INTERNAL MEDICINE

## 2019-06-24 PROCEDURE — 85025 COMPLETE CBC W/AUTO DIFF WBC: CPT

## 2019-06-24 PROCEDURE — 99291 CRITICAL CARE FIRST HOUR: CPT | Performed by: INTERNAL MEDICINE

## 2019-06-24 PROCEDURE — 87641 MR-STAPH DNA AMP PROBE: CPT

## 2019-06-24 PROCEDURE — 36569 INSJ PICC 5 YR+ W/O IMAGING: CPT

## 2019-06-24 PROCEDURE — 83880 ASSAY OF NATRIURETIC PEPTIDE: CPT

## 2019-06-24 PROCEDURE — 76937 US GUIDE VASCULAR ACCESS: CPT

## 2019-06-24 PROCEDURE — 71045 X-RAY EXAM CHEST 1 VIEW: CPT

## 2019-06-24 PROCEDURE — 2000000000 HC ICU R&B

## 2019-06-24 PROCEDURE — 82803 BLOOD GASES ANY COMBINATION: CPT

## 2019-06-24 PROCEDURE — 2700000000 HC OXYGEN THERAPY PER DAY

## 2019-06-24 PROCEDURE — 6370000000 HC RX 637 (ALT 250 FOR IP): Performed by: NURSE PRACTITIONER

## 2019-06-24 PROCEDURE — 83605 ASSAY OF LACTIC ACID: CPT

## 2019-06-24 PROCEDURE — 80048 BASIC METABOLIC PNL TOTAL CA: CPT

## 2019-06-24 PROCEDURE — 83735 ASSAY OF MAGNESIUM: CPT

## 2019-06-24 PROCEDURE — 36600 WITHDRAWAL OF ARTERIAL BLOOD: CPT

## 2019-06-24 PROCEDURE — 6370000000 HC RX 637 (ALT 250 FOR IP): Performed by: INTERNAL MEDICINE

## 2019-06-24 PROCEDURE — 6360000002 HC RX W HCPCS: Performed by: INTERNAL MEDICINE

## 2019-06-24 PROCEDURE — 84295 ASSAY OF SERUM SODIUM: CPT

## 2019-06-24 PROCEDURE — 99233 SBSQ HOSP IP/OBS HIGH 50: CPT | Performed by: INTERNAL MEDICINE

## 2019-06-24 PROCEDURE — 87040 BLOOD CULTURE FOR BACTERIA: CPT

## 2019-06-24 PROCEDURE — 94761 N-INVAS EAR/PLS OXIMETRY MLT: CPT

## 2019-06-24 PROCEDURE — 2580000003 HC RX 258: Performed by: NURSE PRACTITIONER

## 2019-06-24 PROCEDURE — 02HV33Z INSERTION OF INFUSION DEVICE INTO SUPERIOR VENA CAVA, PERCUTANEOUS APPROACH: ICD-10-PCS | Performed by: RADIOLOGY

## 2019-06-24 PROCEDURE — 36415 COLL VENOUS BLD VENIPUNCTURE: CPT

## 2019-06-24 PROCEDURE — 82947 ASSAY GLUCOSE BLOOD QUANT: CPT

## 2019-06-24 PROCEDURE — 85014 HEMATOCRIT: CPT

## 2019-06-24 PROCEDURE — 84132 ASSAY OF SERUM POTASSIUM: CPT

## 2019-06-24 PROCEDURE — 84145 PROCALCITONIN (PCT): CPT

## 2019-06-24 PROCEDURE — 94660 CPAP INITIATION&MGMT: CPT

## 2019-06-24 PROCEDURE — 82330 ASSAY OF CALCIUM: CPT

## 2019-06-24 RX ORDER — SODIUM CHLORIDE 0.9 % (FLUSH) 0.9 %
10 SYRINGE (ML) INJECTION EVERY 12 HOURS SCHEDULED
Status: DISCONTINUED | OUTPATIENT
Start: 2019-06-24 | End: 2019-07-02 | Stop reason: HOSPADM

## 2019-06-24 RX ORDER — LACTOBACILLUS RHAMNOSUS GG 10B CELL
2 CAPSULE ORAL 2 TIMES DAILY WITH MEALS
Status: DISCONTINUED | OUTPATIENT
Start: 2019-06-24 | End: 2019-07-02 | Stop reason: HOSPADM

## 2019-06-24 RX ORDER — LIDOCAINE HYDROCHLORIDE 10 MG/ML
5 INJECTION, SOLUTION EPIDURAL; INFILTRATION; INTRACAUDAL; PERINEURAL ONCE
Status: DISCONTINUED | OUTPATIENT
Start: 2019-06-24 | End: 2019-06-26

## 2019-06-24 RX ORDER — 0.9 % SODIUM CHLORIDE 0.9 %
500 INTRAVENOUS SOLUTION INTRAVENOUS ONCE
Status: COMPLETED | OUTPATIENT
Start: 2019-06-24 | End: 2019-06-24

## 2019-06-24 RX ORDER — SODIUM CHLORIDE 0.9 % (FLUSH) 0.9 %
10 SYRINGE (ML) INJECTION PRN
Status: DISCONTINUED | OUTPATIENT
Start: 2019-06-24 | End: 2019-07-02 | Stop reason: HOSPADM

## 2019-06-24 RX ADMIN — Medication 2 CAPSULE: at 10:43

## 2019-06-24 RX ADMIN — HEPARIN SODIUM 5000 UNITS: 5000 INJECTION INTRAVENOUS; SUBCUTANEOUS at 06:09

## 2019-06-24 RX ADMIN — PREDNISOLONE ACETATE 1 DROP: 1.2 SUSPENSION/ DROPS OPHTHALMIC at 13:04

## 2019-06-24 RX ADMIN — GUAIFENESIN 600 MG: 600 TABLET, EXTENDED RELEASE ORAL at 10:43

## 2019-06-24 RX ADMIN — PIPERACILLIN SODIUM,TAZOBACTAM SODIUM 3.38 G: 3; .375 INJECTION, POWDER, FOR SOLUTION INTRAVENOUS at 02:08

## 2019-06-24 RX ADMIN — SODIUM CHLORIDE 500 ML: 9 INJECTION, SOLUTION INTRAVENOUS at 08:15

## 2019-06-24 RX ADMIN — Medication 10 ML: at 10:45

## 2019-06-24 RX ADMIN — GUAIFENESIN 600 MG: 600 TABLET, EXTENDED RELEASE ORAL at 20:56

## 2019-06-24 RX ADMIN — Medication 2 CAPSULE: at 16:49

## 2019-06-24 RX ADMIN — LEVOTHYROXINE SODIUM 137 MCG: 25 TABLET ORAL at 06:10

## 2019-06-24 RX ADMIN — HEPARIN SODIUM 5000 UNITS: 5000 INJECTION INTRAVENOUS; SUBCUTANEOUS at 20:52

## 2019-06-24 RX ADMIN — Medication 10 ML: at 21:00

## 2019-06-24 RX ADMIN — PIPERACILLIN SODIUM,TAZOBACTAM SODIUM 3.38 G: 3; .375 INJECTION, POWDER, FOR SOLUTION INTRAVENOUS at 17:38

## 2019-06-24 RX ADMIN — BUPROPION HYDROCHLORIDE 150 MG: 150 TABLET, FILM COATED, EXTENDED RELEASE ORAL at 10:43

## 2019-06-24 RX ADMIN — POLYETHYLENE GLYCOL, PROPYLENE GLYCOL 2 DROP: .4; .3 LIQUID OPHTHALMIC at 12:49

## 2019-06-24 RX ADMIN — ROPINIROLE HYDROCHLORIDE 0.25 MG: 0.5 TABLET, FILM COATED ORAL at 20:54

## 2019-06-24 RX ADMIN — PREDNISOLONE ACETATE 1 DROP: 1.2 SUSPENSION/ DROPS OPHTHALMIC at 16:48

## 2019-06-24 RX ADMIN — PREDNISONE 5 MG: 5 TABLET ORAL at 10:44

## 2019-06-24 RX ADMIN — FLUOXETINE 20 MG: 20 CAPSULE ORAL at 10:43

## 2019-06-24 RX ADMIN — AMITRIPTYLINE HYDROCHLORIDE 10 MG: 10 TABLET, FILM COATED ORAL at 20:52

## 2019-06-24 RX ADMIN — FOLIC ACID 1 MG: 1 TABLET ORAL at 10:43

## 2019-06-24 RX ADMIN — HEPARIN SODIUM 5000 UNITS: 5000 INJECTION INTRAVENOUS; SUBCUTANEOUS at 16:46

## 2019-06-24 RX ADMIN — PIPERACILLIN SODIUM,TAZOBACTAM SODIUM 3.38 G: 3; .375 INJECTION, POWDER, FOR SOLUTION INTRAVENOUS at 10:44

## 2019-06-24 RX ADMIN — POLYETHYLENE GLYCOL, PROPYLENE GLYCOL 2 DROP: .4; .3 LIQUID OPHTHALMIC at 16:47

## 2019-06-24 RX ADMIN — POLYETHYLENE GLYCOL, PROPYLENE GLYCOL 2 DROP: .4; .3 LIQUID OPHTHALMIC at 20:50

## 2019-06-24 RX ADMIN — Medication 10 ML: at 20:59

## 2019-06-24 RX ADMIN — POTASSIUM CHLORIDE 10 MEQ: 750 TABLET, FILM COATED, EXTENDED RELEASE ORAL at 10:43

## 2019-06-24 ASSESSMENT — PAIN SCALES - GENERAL
PAINLEVEL_OUTOF10: 0

## 2019-06-24 NOTE — PROGRESS NOTES
0845: Patient to 2116 with PCU RN in specialty bed on bi-pap. Four eyes skin assessment completed. Patient on bi-pap, 500 mL bolus infusing. Jones placed. Patient lethargic but wakes to voice and follows commands. Handoff received from PCU RN.   1435: Critical care rounds completed with Dr. Lacie Cabral and team, see new orders. Lab at bedside attempting to draw, unsuccessful. 1015: PICC consent received, placed in chart. Ok per nephrology to place. 1100: Dr. Ion Singh at bedside, updates provided. See new orders. 1200: Assessment completed, no changes. Patient pulling at bi-pap mask, transitioned to 5L NC. Patient refusing to wear bi-pap after PO medication administered. RN explained risk of elevated CO2 and progression of worsening respiratory function. Patient requesting to speak with pulmonologist, at this time patient reports she does not want to wear bi-pap and if CO2 continues to increase she would not want to be placed on the ventilator. 1230: Dr. Lacie Cabral at bedside, see new orders. Palliative care consult placed. 1530: Patient placed back on bi-pap. PICC RN at bedside. 1700: PICC placement confirmed. 1745: Daughters at bedside, updates provided. Patient reports she would \"want everything\" and wants to remain a full code, daughters agreeable. Dr. Lacie Cabral notified. Patient placed on 5L NC, dinner set up.   1900: Handoff to nightshift RN. Four eyes skin assessment completed.      Electronically signed by Brien Poon RN on 6/24/2019 at 7:37 PM

## 2019-06-24 NOTE — CONSULTS
REASON FOR CONSULTATION/CC: Hypotension, acute hypercapnic respiratory failure, encephalopathy      Consult at request of Sj Miranda DO     PCP: Mirela Santiago  Established Pulmonologist:  None    Chief Complaint   Patient presents with    Other     not eating or drinking normally cant remember when she ate last        HISTORY OF PRESENT ILLNESS: Raheem Carpio is a [de-identified]y.o. year old female with a history of thyroidism, anxiety, rheumatoid arthritis, CKD, CML, CHF who presented with whole body pain and malaise for 1 day prior to admission. She was found to have a urinary tract infection, urine culture growing E. coli (pansensitive ). She has been treated with empiric antibiotics, her stay has been complicated by SVT and pulmonary edema. Is been undergoing aggressive diuresis, however this morning rapid response called for acutely altered mental status, worsening hypoxia and hypotension. ABG obtained shows pH 7.1 with acute hypercapnia, placed on BiPAP. Patient tolerated BiPAP for only couple hours before taking off and refusing to put back on. She is more awake alert and says she will wear the BiPAP if CO2 continues to climb. Currently blood pressure is improved. .  Holding home antihypertensives and diuretics at this time. She tells me that her breathing is much better now compared to what this morning, and she also reiterates to be issued at the nurse that she would not like to be intubated.       Past Medical History:   Diagnosis Date    Acquired hypothyroidism 11/4/2015    Anxiety and depression 11/4/2015    Arthritis, rheumatoid (HCC)     Chronic idiopathic gout of multiple sites 11/4/2015    Chronic pain syndrome 11/4/2015    CKD (chronic kidney disease) stage 2, GFR 60-89 ml/min 11/4/2015    CML (chronic myelocytic leukemia) ([facility] [address] 11/4/2015    Congenital heart disease     Depression     H/O: CML (chronic myeloid leukemia)     History of CHF (congestive heart failure) 11/4/2015    CHEST STANDARD (2 VW)    Narrative EXAMINATION:  TWO XRAY VIEWS OF THE CHEST    6/20/2019 2:13 pm    COMPARISON:  06/05/2018    HISTORY:  ORDERING SYSTEM PROVIDED HISTORY: hypoxia, SOB  TECHNOLOGIST PROVIDED HISTORY:  Reason for exam:->hypoxia, SOB  Ordering Physician Provided Reason for Exam: hypoxia  Additional signs and symptoms: Other (not eating or drinking normally cant  remember when she ate last )  Relevant Medical/Surgical History: CHF    FINDINGS:  Cardial-pericardial silhouette is enlarged but stable. There is increased  central and basilar opacity, with bilateral pleural effusions, greater on the  left, all of which are increased when compared to the previous exam.  No  pneumothorax is found. No free air. No acute bony abnormality. Impression Findings most compatible with worsening or recurrent pulmonary edema. Correlate with any clinical evidence of superimposed infection. CXR portable:   Results for orders placed during the hospital encounter of 06/20/19   XR CHEST PORTABLE    Narrative EXAMINATION:  ONE XRAY VIEW OF THE CHEST    6/22/2019 8:24 am    COMPARISON:  06/20/2019    HISTORY:  ORDERING SYSTEM PROVIDED HISTORY: breath sound changes  TECHNOLOGIST PROVIDED HISTORY:  Reason for exam:->breath sound changes  Ordering Physician Provided Reason for Exam: labored breathing  Acuity: Acute  Type of Exam: Initial  Relevant Medical/Surgical History: hard to arouse    FINDINGS:  Cardiomegaly. Prominent central pulmonary vessels. Progressive  indistinctness of the peripheral pulmonary vessels. Persistent left larger  than right pleural effusions.       Impression Cardiomegaly with radiographic worsening of pulmonary edema and persistent  left larger than right pleural effusions         Access  Arterial       PICC           CVC               Assessment:       Hypotension  Acute Hypoxemic Respiratory Failure with SAO2 <90% on Room Air  Acute Hypercapnic Respiratory Failure, with ph <7.2  Acute CHF  Acute pulmonary edema  NOBLE  Sepsis, due to   Acute Cystitis due to e.coli   Hypothermia  Acute metabolic encephalopathy    Plan:        -cont zosyn  -lactobacillus  -normal lactate, repeat bcx  -repeat ABG 1 hour on BIPAP, continue NIPPV for now  -repeat CXR pending  -hold diuresis and antihypertensive for now  -PICC, if remains hypotense would favor early initiation of vasopressors rather than IVFB due to  Pulmonary edema    Due to the immediate potential for life-threatening deterioration due to acute mental status change, respiratory failure requiring positive pressure ventilation, I spent 53 minutes providing critical care. This time is excluding time spent performing procedures. This note was transcribed using [id number]. Please disregard any translational errors.     Thank you for the consult    [address] Pulmonary, Sleep and Critical Care Medicine

## 2019-06-24 NOTE — PROGRESS NOTES
polyethyl glycol-propyl glycol 0.4-0.3 % (SYSTANE) ophthalmic solution 2 drop PRN       Allergies:  Latex and Sulfa antibiotics     Review of Systems:   · Constitutional: no unanticipated weight loss. There's been no change in energy level, sleep pattern, or activity level. No fevers, chills. · Eyes: No visual changes or diplopia. No scleral icterus. · ENT: No Headaches, hearing loss or vertigo. No mouth sores or sore throat. · Cardiovascular: as reviewed in HPI  · Respiratory: No cough or wheezing, no sputum production. No hematemesis. · Gastrointestinal: No abdominal pain, appetite loss, blood in stools. No change in bowel or bladder habits. · Genitourinary: No dysuria, trouble voiding, or hematuria. · Musculoskeletal:  No gait disturbance, no joint complaints. · Integumentary: No rash or pruritis. · Neurological: No headache, diplopia, change in muscle strength, numbness or tingling. · Psychiatric: No anxiety or depression. · Endocrine: No temperature intolerance. No excessive thirst, fluid intake, or urination. No tremor. · Hematologic/Lymphatic: No abnormal bruising or bleeding, blood clots or swollen lymph nodes. · Allergic/Immunologic: No nasal congestion or hives. Objective:   PHYSICAL EXAM:    Vitals:    06/24/19 1100   BP: (!) 98/56   Pulse: 66   Resp: 15   Temp:    SpO2: 91%    Weight: 171 lb 11.8 oz (77.9 kg)       General Appearance:  Drowsy but wakes to voice. Cooperative, no distress. Elderly. Head:  Normocephalic, without obvious abnormality, atraumatic. Eyes:  Pupils equal and round. No scleral icterus. Mouth: Moist mucosa, no pharyngeal erythema. Nose: Nares normal. No drainage or sinus tenderness. Neck: Supple, symmetrical, trachea midline. No adenopathy. No tenderness/mass/nodules. No carotid bruit or elevated JVD. Lungs:   Clear to auscultation bilaterally, respirations unlabored. No wheeze, rales, or rhonchi. Chest Wall:  No tenderness or deformity.    Heart:

## 2019-06-24 NOTE — PROGRESS NOTES
4 Eyes Skin Assessment     The patient is being assess for  Shift Handoff    I agree that 2 RN's have performed a thorough Head to Toe Skin Assessment on the patient. ALL assessment sites listed below have been assessed. Areas assessed by both nurses:   [x]   Head, Face, and Ears   [x]   Shoulders, Back, and Chest  [x]   Arms, Elbows, and Hands   [x]   Coccyx, Sacrum, and IschIum  [x]   Legs, Feet, and Heels        Does the Patient have Skin Breakdown?   Yes         Ramon Prevention initiated:  Yes   Wound Care Orders initiated:  No      WOC nurse consulted for Pressure Injury (Stage 3,4, Unstageable, DTI, NWPT, and Complex wounds), New and Established Ostomies:  NA      Nurse 1 eSignature: Electronically signed by Cecillia Bumpers, RN on 6/24/19 at 6:51 PM    **SHARE this note so that the co-signing nurse is able to place an eSignature**    Nurse 2 eSignature: {Esignature:[id number]}

## 2019-06-24 NOTE — SIGNIFICANT EVENT
Progress Note  Admit Date: 2019      PCP: Renée Burnham     Rapid response called as patient was hypothermic hypotensive and lethargic    SUBJECTIVE / Interval History:  Patient is very lethargic wakes up to sternal rub. Rectal temperature 95. Blood pressure in the low 60s        Allergies  Latex and Sulfa antibiotics    Medications    Scheduled Meds:   sodium chloride  500 mL Intravenous Once    metoprolol tartrate  12.5 mg Oral BID    piperacillin-tazobactam  3.375 g Intravenous Q8H    guaiFENesin  600 mg Oral BID    heparin (porcine)  5,000 Units Subcutaneous 3 times per day    sodium chloride flush  10 mL Intravenous 2 times per day    amitriptyline  10 mg Oral Nightly    buPROPion  150 mg Oral Daily    dasatinib  50 mg Oral Daily    FLUoxetine  20 mg Oral Daily    folic acid  1 mg Oral Daily    levothyroxine  137 mcg Oral Daily    potassium chloride  10 mEq Oral Daily    prednisoLONE acetate  1 drop Both Eyes TID    predniSONE  5 mg Oral Daily    rOPINIRole  0.25 mg Oral Nightly     Continuous Infusions:    PRN Meds:  HYDROcodone-acetaminophen, metoprolol, sodium chloride flush, ondansetron, acetaminophen, diazepam, polyethyl glycol-propyl glycol 0.4-0.3 %    Vitals    TEMPERATURE:  Current - Temp: 95.6 °F (35.3 °C); Max - Temp  Av.2 °F (36.2 °C)  Min: 95.6 °F (35.3 °C)  Max: 97.6 °F (36.4 °C)  RESPIRATIONS RANGE: Resp  Av.6  Min: 16  Max: 18  PULSE RANGE: Pulse  Av.2  Min: 69  Max: 112  BLOOD PRESSURE RANGE:  Systolic (01JSY), CH , Min:69 , CIQ:546   ; Diastolic (54OIJ), KRI:86, Min:37, Max:76    PULSE OXIMETRY RANGE: SpO2  Av.7 %  Min: 88 %  Max: 97 %  24HR INTAKE/OUTPUT:      Intake/Output Summary (Last 24 hours) at 2019  Last data filed at 2019 2154  Gross per 24 hour   Intake 730 ml   Output 250 ml   Net 480 ml       Exam:    Gen: No distress. Lethargic  Eyes: PERRL. No sclera icterus. No conjunctival injection. ENT: No discharge. Pharynx clear. External appearance of ears and nose normal.  Neck: Trachea midline. No obvious mass. Resp: No accessory muscle use. No crackles. No wheezes. No rhonchi. AE decreased at base  CV: Regular rate. Regular rhythm. No murmur or rub. No edema. GI: Non-tender. Non-distended. No hernia. Skin: Warm, dry, normal texture and turgor. No nodule on exposed extremities. Lymph: No cervical LAD. No supraclavicular LAD. M/S: No cyanosis. No clubbing. No joint deformity. Neuro: Moves all four extremities. CN 2-12 tested, no defect noted. Psych: lethargic    Data    LABS  CBC:   Recent Labs     06/22/19  0430 06/24/19  0645 06/24/19  0814   WBC 24.6* 16.7*  --    HGB 12.4 13.2 19.2*   HCT 39.2 41.6  --    MCV 89.3 92.9  --    * 674*  --      BMP:   Recent Labs     06/22/19  0430 06/23/19  0503 06/24/19  0645   * 137 134*   K 4.3 4.7 4.7   CL 99 103 97*   CO2 17* 19* 20*   PHOS 4.6  --   --    BUN 57* 57* 52*   CREATININE 2.3* 2.1* 2.0*     POC GLUCOSE:    Recent Labs     06/22/19  0804 06/24/19  0729 06/24/19  0814   POCGLU 103* 140* 149*     LIVER PROFILE:   Recent Labs     06/22/19  0430   AST 14*   ALT 6*   LABALBU 2.4*   BILIDIR <0.2   BILITOT 0.3   ALKPHOS 66     PT/INR: No results for input(s): PROTIME, INR in the last 72 hours. APTT: No results for input(s): APTT in the last 72 hours. UA:No results for input(s): NITRITE, COLORU, PHUR, LABCAST, WBCUA, RBCUA, MUCUS, TRICHOMONAS, YEAST, BACTERIA, CLARITYU, SPECGRAV, LEUKOCYTESUR, UROBILINOGEN, BILIRUBINUR, BLOODU, GLUCOSEU, KETUA, AMORPHOUS in the last 72 hours. Microbiology:  Wound Culture: No results for input(s): WNDABS, ORG in the last 72 hours. Invalid input(s):  LABGRAM  Nasal Culture: No results for input(s): ORG, MRSAPCR in the last 72 hours. Blood Culture: No results for input(s): BC, BLOODCULT2 in the last 72 hours. Fungal Culture:   No results for input(s): FUNGSM in the last 72 hours.   No results for input(s): FUNCXBLD

## 2019-06-24 NOTE — PROGRESS NOTES
Patient is arousing but very lethargic, BP 69-51 , sat on 4 liter is 77% an rectal temp 95.6. Placed on NRB, [employer], called charge RN and Respiratory.

## 2019-06-24 NOTE — PROGRESS NOTES
Hospitalist Progress Note      PCP: Maryse Vital    Date of Admission: 6/20/2019    Subjective: Pt S/E. A RR was called this am for pt being lethargic, hypotensive with sbp in the low 60s, hypothermic at 95 rectal. bp improved with trendelenburg but still low in the 80s. She was transferred to the icu. She is currently on bipap     Medications:  Reviewed    Infusion Medications   Scheduled Medications    sodium chloride  500 mL Intravenous Once    metoprolol tartrate  12.5 mg Oral BID    piperacillin-tazobactam  3.375 g Intravenous Q8H    guaiFENesin  600 mg Oral BID    heparin (porcine)  5,000 Units Subcutaneous 3 times per day    sodium chloride flush  10 mL Intravenous 2 times per day    amitriptyline  10 mg Oral Nightly    buPROPion  150 mg Oral Daily    dasatinib  50 mg Oral Daily    FLUoxetine  20 mg Oral Daily    folic acid  1 mg Oral Daily    levothyroxine  137 mcg Oral Daily    potassium chloride  10 mEq Oral Daily    prednisoLONE acetate  1 drop Both Eyes TID    predniSONE  5 mg Oral Daily    rOPINIRole  0.25 mg Oral Nightly     PRN Meds: HYDROcodone-acetaminophen, metoprolol, sodium chloride flush, ondansetron, acetaminophen, diazepam, polyethyl glycol-propyl glycol 0.4-0.3 %      Intake/Output Summary (Last 24 hours) at 6/24/2019 0826  Last data filed at 6/23/2019 2154  Gross per 24 hour   Intake 730 ml   Output 250 ml   Net 480 ml       Physical Exam Performed:    BP (!) 82/43   Pulse 82   Temp 95.6 °F (35.3 °C) (Rectal)   Resp 16   Ht 5' 5\" (1.651 m)   Wt 171 lb 11.8 oz (77.9 kg)   SpO2 (!) 88%   BMI 28.58 kg/m²     General appearance:  lethargic, on bipap  HEENT:  Normal cephalic, atraumatic without obvious deformity. Pupils equal, round, and reactive to light.  Extra ocular muscles intact. Conjunctivae/corneas clear. Neck: Supple, with full range of motion. No jugular venous distention. Trachea midline.   Respiratory: diminished b/l, no

## 2019-06-25 LAB
ALBUMIN SERPL-MCNC: 2.7 G/DL (ref 3.4–5)
ANION GAP SERPL CALCULATED.3IONS-SCNC: 13 MMOL/L (ref 3–16)
BASE EXCESS VENOUS: -4.2 MMOL/L
BASOPHILS ABSOLUTE: 0 K/UL (ref 0–0.2)
BASOPHILS RELATIVE PERCENT: 0.3 %
BUN BLDV-MCNC: 57 MG/DL (ref 7–20)
CALCIUM SERPL-MCNC: 8.6 MG/DL (ref 8.3–10.6)
CARBOXYHEMOGLOBIN: 2 %
CHLORIDE BLD-SCNC: 104 MMOL/L (ref 99–110)
CO2: 23 MMOL/L (ref 21–32)
CREAT SERPL-MCNC: 2 MG/DL (ref 0.6–1.2)
EOSINOPHILS ABSOLUTE: 0.3 K/UL (ref 0–0.6)
EOSINOPHILS RELATIVE PERCENT: 2.4 %
GFR AFRICAN AMERICAN: 29
GFR NON-AFRICAN AMERICAN: 24
GLUCOSE BLD-MCNC: 90 MG/DL (ref 70–99)
HCO3 VENOUS: 24 MMOL/L (ref 23–29)
HCT VFR BLD CALC: 38.3 % (ref 36–48)
HEMOGLOBIN: 12 G/DL (ref 12–16)
LYMPHOCYTES ABSOLUTE: 0.3 K/UL (ref 1–5.1)
LYMPHOCYTES RELATIVE PERCENT: 2.3 %
MAGNESIUM: 2.4 MG/DL (ref 1.8–2.4)
MCH RBC QN AUTO: 28.2 PG (ref 26–34)
MCHC RBC AUTO-ENTMCNC: 31.4 G/DL (ref 31–36)
MCV RBC AUTO: 89.7 FL (ref 80–100)
METHEMOGLOBIN VENOUS: 0.4 %
MONOCYTES ABSOLUTE: 1.3 K/UL (ref 0–1.3)
MONOCYTES RELATIVE PERCENT: 8.5 %
NEUTROPHILS ABSOLUTE: 12.7 K/UL (ref 1.7–7.7)
NEUTROPHILS RELATIVE PERCENT: 86.5 %
O2 CONTENT, VEN: 18 ML/DL
O2 SAT, VEN: 99 %
O2 THERAPY: ABNORMAL
PCO2, VEN: 58.3 MMHG (ref 40–50)
PDW BLD-RTO: 19.4 % (ref 12.4–15.4)
PH VENOUS: 7.23 (ref 7.35–7.45)
PHOSPHORUS: 4.7 MG/DL (ref 2.5–4.9)
PLATELET # BLD: 631 K/UL (ref 135–450)
PMV BLD AUTO: 7.5 FL (ref 5–10.5)
PO2, VEN: 161 MMHG
POTASSIUM SERPL-SCNC: 4.6 MMOL/L (ref 3.5–5.1)
PRO-BNP: ABNORMAL PG/ML (ref 0–449)
RBC # BLD: 4.27 M/UL (ref 4–5.2)
SODIUM BLD-SCNC: 140 MMOL/L (ref 136–145)
TCO2 CALC VENOUS: 25 MMOL/L
WBC # BLD: 14.7 K/UL (ref 4–11)

## 2019-06-25 PROCEDURE — 85025 COMPLETE CBC W/AUTO DIFF WBC: CPT

## 2019-06-25 PROCEDURE — 97530 THERAPEUTIC ACTIVITIES: CPT

## 2019-06-25 PROCEDURE — 82803 BLOOD GASES ANY COMBINATION: CPT

## 2019-06-25 PROCEDURE — 97162 PT EVAL MOD COMPLEX 30 MIN: CPT

## 2019-06-25 PROCEDURE — 6370000000 HC RX 637 (ALT 250 FOR IP): Performed by: NURSE PRACTITIONER

## 2019-06-25 PROCEDURE — 94760 N-INVAS EAR/PLS OXIMETRY 1: CPT

## 2019-06-25 PROCEDURE — APPNB15 APP NON BILLABLE TIME 0-15 MINS: Performed by: NURSE PRACTITIONER

## 2019-06-25 PROCEDURE — 2580000003 HC RX 258: Performed by: NURSE PRACTITIONER

## 2019-06-25 PROCEDURE — 6360000002 HC RX W HCPCS: Performed by: INTERNAL MEDICINE

## 2019-06-25 PROCEDURE — 6360000002 HC RX W HCPCS: Performed by: NURSE PRACTITIONER

## 2019-06-25 PROCEDURE — 36592 COLLECT BLOOD FROM PICC: CPT

## 2019-06-25 PROCEDURE — 83735 ASSAY OF MAGNESIUM: CPT

## 2019-06-25 PROCEDURE — 94668 MNPJ CHEST WALL SBSQ: CPT

## 2019-06-25 PROCEDURE — 99233 SBSQ HOSP IP/OBS HIGH 50: CPT | Performed by: INTERNAL MEDICINE

## 2019-06-25 PROCEDURE — 94660 CPAP INITIATION&MGMT: CPT

## 2019-06-25 PROCEDURE — 97535 SELF CARE MNGMENT TRAINING: CPT

## 2019-06-25 PROCEDURE — 83880 ASSAY OF NATRIURETIC PEPTIDE: CPT

## 2019-06-25 PROCEDURE — 97166 OT EVAL MOD COMPLEX 45 MIN: CPT

## 2019-06-25 PROCEDURE — 6370000000 HC RX 637 (ALT 250 FOR IP): Performed by: INTERNAL MEDICINE

## 2019-06-25 PROCEDURE — 2700000000 HC OXYGEN THERAPY PER DAY

## 2019-06-25 PROCEDURE — 2000000000 HC ICU R&B

## 2019-06-25 PROCEDURE — 80069 RENAL FUNCTION PANEL: CPT

## 2019-06-25 PROCEDURE — 2580000003 HC RX 258: Performed by: INTERNAL MEDICINE

## 2019-06-25 RX ORDER — FUROSEMIDE 10 MG/ML
40 INJECTION INTRAMUSCULAR; INTRAVENOUS DAILY
Status: DISCONTINUED | OUTPATIENT
Start: 2019-06-25 | End: 2019-06-26

## 2019-06-25 RX ADMIN — HEPARIN SODIUM 5000 UNITS: 5000 INJECTION INTRAVENOUS; SUBCUTANEOUS at 06:13

## 2019-06-25 RX ADMIN — PIPERACILLIN SODIUM,TAZOBACTAM SODIUM 3.38 G: 3; .375 INJECTION, POWDER, FOR SOLUTION INTRAVENOUS at 02:05

## 2019-06-25 RX ADMIN — Medication 10 ML: at 21:00

## 2019-06-25 RX ADMIN — PIPERACILLIN SODIUM,TAZOBACTAM SODIUM 3.38 G: 3; .375 INJECTION, POWDER, FOR SOLUTION INTRAVENOUS at 10:20

## 2019-06-25 RX ADMIN — HEPARIN SODIUM 5000 UNITS: 5000 INJECTION INTRAVENOUS; SUBCUTANEOUS at 22:50

## 2019-06-25 RX ADMIN — BUPROPION HYDROCHLORIDE 150 MG: 150 TABLET, FILM COATED, EXTENDED RELEASE ORAL at 10:55

## 2019-06-25 RX ADMIN — LEVOTHYROXINE SODIUM 137 MCG: 25 TABLET ORAL at 08:11

## 2019-06-25 RX ADMIN — FLUOXETINE 20 MG: 20 CAPSULE ORAL at 10:55

## 2019-06-25 RX ADMIN — ROPINIROLE HYDROCHLORIDE 0.25 MG: 0.5 TABLET, FILM COATED ORAL at 22:50

## 2019-06-25 RX ADMIN — FOLIC ACID 1 MG: 1 TABLET ORAL at 08:11

## 2019-06-25 RX ADMIN — AMITRIPTYLINE HYDROCHLORIDE 10 MG: 10 TABLET, FILM COATED ORAL at 22:51

## 2019-06-25 RX ADMIN — Medication 2 CAPSULE: at 08:11

## 2019-06-25 RX ADMIN — GUAIFENESIN 600 MG: 600 TABLET, EXTENDED RELEASE ORAL at 08:11

## 2019-06-25 RX ADMIN — GUAIFENESIN 600 MG: 600 TABLET, EXTENDED RELEASE ORAL at 22:50

## 2019-06-25 RX ADMIN — PREDNISONE 5 MG: 5 TABLET ORAL at 08:11

## 2019-06-25 RX ADMIN — FUROSEMIDE 40 MG: 10 INJECTION, SOLUTION INTRAMUSCULAR; INTRAVENOUS at 10:20

## 2019-06-25 RX ADMIN — Medication 2 CAPSULE: at 17:41

## 2019-06-25 RX ADMIN — Medication 10 ML: at 08:12

## 2019-06-25 RX ADMIN — HEPARIN SODIUM 5000 UNITS: 5000 INJECTION INTRAVENOUS; SUBCUTANEOUS at 14:23

## 2019-06-25 RX ADMIN — PIPERACILLIN SODIUM,TAZOBACTAM SODIUM 3.38 G: 3; .375 INJECTION, POWDER, FOR SOLUTION INTRAVENOUS at 17:41

## 2019-06-25 RX ADMIN — POTASSIUM CHLORIDE 10 MEQ: 750 TABLET, FILM COATED, EXTENDED RELEASE ORAL at 08:11

## 2019-06-25 ASSESSMENT — PAIN SCALES - GENERAL
PAINLEVEL_OUTOF10: 0

## 2019-06-25 NOTE — PROGRESS NOTES
atelectasis in the   left lung base. CT ABDOMEN PELVIS WO CONTRAST Additional Contrast? None   Final Result   1. Nonspecific bilateral pleural effusion with bibasilar consolidation   unchanged from prior study. 2. Abdominal and pelvic ascites and anasarca presumably related to fluid   volume overload. 3. No urinary collecting system calculus evident. 4. Main pulmonary artery dilatation can be seen with pulmonary hypertension. 5. 10 mm hyperdense nodule inferior pole left kidney almost certainly   reflects hemorrhagic cyst.  Follow-up CT abdomen in 6-12 months recommended   to ensure stability. 6. Mild anasarca. 7. Calcific atherosclerotic disease aorta. 8. Small, fat and ascites containing inguinal hernias bilateral.   9. Moderate size hiatal hernia. 10. Advanced degenerative changes lumbar spine at L4-5 with grade 2   anterolisthesis L4 on L5. XR CHEST PORTABLE   Final Result   Cardiomegaly with radiographic worsening of pulmonary edema and persistent   left larger than right pleural effusions         US RENAL COMPLETE   Final Result   Mild diffuse increased echogenicity of the kidneys consistent with medical   renal disease. CT CHEST WO CONTRAST   Final Result   1. Interstitial pulmonary edema, moderate to large bilateral pleural   effusions, and mild cardiomegaly, suggesting congestive heart failure. 2. Passive atelectasis in the bilateral lower lobes. Superimposed pneumonia   or aspiration may be present bilaterally. 3. Findings suggestive of pulmonary hypertension. 4. Perivascular stranding adjacent to the splenic hilum, an indeterminate   finding that could be related to underlying splenic vein thrombosis. Consider further evaluation with sonography or a contrast-enhanced abdomen   and pelvis CT. 5. Trace pericardial effusion and mild anasarca.          XR CHEST STANDARD (2 VW)   Final Result   Findings most compatible with worsening or recurrent pulmonary

## 2019-06-25 NOTE — PROGRESS NOTES
buPROPion  150 mg Oral Daily    dasatinib  50 mg Oral Daily    FLUoxetine  20 mg Oral Daily    folic acid  1 mg Oral Daily    levothyroxine  137 mcg Oral Daily    potassium chloride  10 mEq Oral Daily    prednisoLONE acetate  1 drop Both Eyes TID    predniSONE  5 mg Oral Daily    rOPINIRole  0.25 mg Oral Nightly       Continuous Infusions:   norepinephrine         PRN Meds:  sodium chloride flush, HYDROcodone-acetaminophen, [Held by provider] metoprolol, sodium chloride flush, ondansetron, acetaminophen, diazepam, polyethyl glycol-propyl glycol 0.4-0.3 %    Labs reviewed:  CBC:   Recent Labs     06/24/19  0645 06/24/19  0814 06/24/19  1033 06/25/19  0612   WBC 16.7*  --  12.7* 14.7*   HGB 13.2 19.2* 13.0 12.0   HCT 41.6  --  40.3 38.3   MCV 92.9  --  91.1 89.7   *  --  605* 631*     BMP:   Recent Labs     06/23/19  0503 06/24/19  0645 06/25/19  0612    134* 140   K 4.7 4.7 4.6    97* 104   CO2 19* 20* 23   PHOS  --   --  4.7   BUN 57* 52* 57*   CREATININE 2.1* 2.0* 2.0*     LIVER PROFILE: No results for input(s): AST, ALT, LIPASE, BILIDIR, BILITOT, ALKPHOS in the last 72 hours. Invalid input(s): AMYLASE,  ALB  PT/INR: No results for input(s): PROTIME, INR in the last 72 hours. APTT: No results for input(s): APTT in the last 72 hours. UA:No results for input(s): NITRITE, COLORU, PHUR, LABCAST, WBCUA, RBCUA, MUCUS, TRICHOMONAS, YEAST, BACTERIA, CLARITYU, SPECGRAV, LEUKOCYTESUR, UROBILINOGEN, BILIRUBINUR, BLOODU, GLUCOSEU, AMORPHOUS in the last 72 hours. Invalid input(s): Francisca Gt  No results for input(s): PH, PCO2, PO2 in the last 72 hours. Films:  Radiology Review:  Pertinent images / reports were reviewed as a part of this visit. CT Chest w/ contrast: No results found for this or any previous visit.     CT Chest w/o contrast:   Results for orders placed during the hospital encounter of 06/20/19   CT CHEST WO CONTRAST    Narrative EXAMINATION:  CT OF THE CHEST WITHOUT

## 2019-06-25 NOTE — PROGRESS NOTES
(acute kidney injury) ([facility] Dehydration, Leukocytosis, unspecified type, Elevated brain natriuretic peptide (BNP) level, and Acute cystitis with hematuria were also pertinent to this visit. has a past medical history of Acquired hypothyroidism, Anxiety and depression, Arthritis, rheumatoid (HCC), Chronic idiopathic gout of multiple sites, Chronic pain syndrome, CKD (chronic kidney disease) stage 2, GFR 60-89 ml/min, CML (chronic myelocytic leukemia) ([facility] Congenital heart disease, Depression, H/O: CML (chronic myeloid leukemia), History of CHF (congestive heart failure), Hypothyroid, and Rheumatoid arthritis of multiple sites with negative rheumatoid factor ([facility] has a past surgical history that includes Total knee arthroplasty (Bilateral, 2008); Gallbladder surgery; and Cholecystectomy. Restrictions  Restrictions/Precautions  Restrictions/Precautions: Fall Risk  Position Activity Restriction  Other position/activity restrictions: O2 5L via NC. Vision/Hearing  Vision: Within Functional Limits  Hearing: Within functional limits     Subjective  General  Chart Reviewed: Yes  Patient assessed for rehabilitation services?: Yes  Additional Pertinent Hx: [de-identified] y/o female admit 6/20/19 with NSTEMI, NOBLE. PMH as noted including CKD, CHF, CML, RA, Chronic Idiopathic Gout, B TKRs, Chronic Pain. Family / Caregiver Present: No  Referring Practitioner: Ion Abreu NP. Diagnosis: NSTEMI, NOBLE. Follows Commands: Within Functional Limits  Subjective  Subjective: Pt agreeable to PT Eval/Rx. Pt denies any pain at this time. Pain Screening  Patient Currently in Pain: Denies          Orientation  Orientation  Overall Orientation Status: Within Functional Limits  Social/Functional History  Social/Functional History  Lives With: Family(, Son.  currently at [facility] (SNF setting).   )  Type of Home: House  Home Layout: Multi-level, Able to Live on Main level with bedroom/bathroom, Laundry in basement(Pt has Nurse notified    G-Code       OutComes Score                                                  AM-PAC Score  AM-PAC Inpatient Mobility Raw Score : 17 (06/25/19 1220)  AM-PAC Inpatient T-Scale Score : 42.13 (06/25/19 1220)  Mobility Inpatient CMS 0-100% Score: 50.57 (06/25/19 1220)  Mobility Inpatient CMS G-Code Modifier : CK (06/25/19 1220)          Goals  Short term goals  Time Frame for Short term goals: Upon d/c acute care setting. Short term goal 1: Bed Mob SBA. Short term goal 2: Transfers with/without assist device SBA. Short term goal 3: Amb with/without assist device 100' SBA/CGA. Patient Goals   Patient goals : Be able to go back home.         Therapy Time   Individual Concurrent Group Co-treatment   Time In 1020         Time Out 1050         Minutes [address] Chopra

## 2019-06-25 NOTE — PROGRESS NOTES
NSTEMI  Subjective  Subjective: Pt met bedside for OT eval/tx. Pt agreeable to therapy, denies pain  Patient Currently in Pain: Denies  Pain Assessment  Pain Assessment: 0-10  Pain Level: 0  Patient's Stated Pain Goal: No pain  Vital Signs  Temp: 98 °F (36.7 °C)  Temp Source: Axillary  Pulse: 80  Heart Rate Source: Monitor  Resp: 22  BP: 131/67  BP Location: Left lower arm  BP Upper/Lower: Lower  MAP (mmHg): 80  Patient Position: Semi fowlers  Level of Consciousness: Alert  MEWS Score: 2  Patient Currently in Pain: Denies  Oxygen Therapy  SpO2: 95 %  Pulse Oximeter Device Mode: Continuous  Pulse Oximeter Device Location: Finger  O2 Device: Nasal cannula  O2 Flow Rate (L/min): 5 L/min  Social/Functional History  Social/Functional History  Lives With: Family(, Son.  currently at [facility] (SNF setting). )  Type of Home: House  Home Layout: Multi-level, Able to Live on Main level with bedroom/bathroom, Laundry in basement(Pt has been taking care of laundry, son able to assist if needed. )  Home Access: Stairs to enter without rails(2 steps to enter. )  Bathroom Shower/Tub: Tub/Shower unit  Bathroom Toilet: Standard  Bathroom Equipment: Grab bars in shower  Bathroom Accessibility: Accessible  Home Equipment: Cane( has/uses Walker. )  ADL Assistance: [address] [place] [address] Avenue: (Shared with family. )  Ambulation Assistance: Independent(With Cane. )  Transfer Assistance: Independent  Active : No( was driving prior admit SNF setting. Son drives. )  Occupation: Retired  Type of occupation: Retired - worked for [employer]. Additional Comments: Pt reports son doesn't work, able to provide adequate assist/support. Objective   Vision: Within Functional Limits  Hearing: Within functional limits    Orientation  Overall Orientation Status: Within Functional Limits  Observation/Palpation  Observation: Severe RA B Hands.   Balance  Sitting Balance: Stand by assistance  Standing Balance: Contact guard assistance  Standing Balance  Time: ~10 mins  Activity: grooming at sink  Comment: RW  Functional Mobility  Functional - Mobility Device: Rolling Walker  Activity: Other(recliner > < sink (~6-7 feet) )  Assist Level: Contact guard assistance  Functional Mobility Comments: Pt performed fxl mobility in room with RW, CGA.  Pt slow and cautious but no unsteadiness or LOB noted  ADL  Grooming: Contact guard assistance(oral care, washing face in stance at sink)  Additional Comments: Anticipate pt to require setup for seated feeding/grooming, CGA/min A for seated UB bathing/dressing, min A for LB dressing/bathing, min A for toileting based on ROM, strength, endurance        Bed mobility  Supine to Sit: Unable to assess  Comment: Pt up in recliner at start and end of session  Transfers  Stand Step Transfers: Contact guard assistance  Sit to stand: Contact guard assistance  Stand to sit: Contact guard assistance  Transfer Comments: RW     Cognition  Overall Cognitive Status: WFL        Sensation  Overall Sensation Status: WFL(Pt denies any numb/tingling. )        LUE AROM (degrees)  LUE General AROM: grossly 120 degrees shoulder flexion  Left Hand AROM (degrees)  Left Hand AROM: Exceptions  Left Hand General AROM: pt with significant arthritis  RUE AROM (degrees)  RUE General AROM: grossly 120 degrees shoulder flexion  Right Hand AROM (degrees)  Right Hand General AROM: pt with significant arthritis  LUE Strength  LUE Strength Comment: grossly 3/5  RUE Strength  RUE Strength Comment: grossly 3+/5                   Plan   Plan  Times per week: 3-5  Times per day: Daily  Current Treatment Recommendations: Strengthening, Functional Mobility Training, Endurance Training, ROM, Balance Training, Self-Care / ADL, Safety Education & Training      AM-PAC Score        -[facility] Inpatient Daily Activity Raw Score: 19 (06/25/19 2419)  AM-PAC Inpatient ADL T-Scale Score : 40.22 (06/25/19 1338)  ADL

## 2019-06-26 PROBLEM — E44.1 MILD MALNUTRITION (HCC): Status: ACTIVE | Noted: 2019-06-26

## 2019-06-26 LAB
ALBUMIN SERPL-MCNC: 2.6 G/DL (ref 3.4–5)
ANION GAP SERPL CALCULATED.3IONS-SCNC: 12 MMOL/L (ref 3–16)
BASE EXCESS VENOUS: -0.9 MMOL/L
BASOPHILS ABSOLUTE: 0.2 K/UL (ref 0–0.2)
BASOPHILS RELATIVE PERCENT: 2 %
BUN BLDV-MCNC: 45 MG/DL (ref 7–20)
CALCIUM SERPL-MCNC: 8.9 MG/DL (ref 8.3–10.6)
CARBOXYHEMOGLOBIN: 2.3 %
CHLORIDE BLD-SCNC: 101 MMOL/L (ref 99–110)
CO2: 26 MMOL/L (ref 21–32)
CREAT SERPL-MCNC: 1.4 MG/DL (ref 0.6–1.2)
EOSINOPHILS ABSOLUTE: 0.5 K/UL (ref 0–0.6)
EOSINOPHILS RELATIVE PERCENT: 4.5 %
GFR AFRICAN AMERICAN: 44
GFR NON-AFRICAN AMERICAN: 36
GLUCOSE BLD-MCNC: 92 MG/DL (ref 70–99)
HCO3 VENOUS: 26 MMOL/L (ref 23–29)
HCT VFR BLD CALC: 38.8 % (ref 36–48)
HEMOGLOBIN: 12.6 G/DL (ref 12–16)
LYMPHOCYTES ABSOLUTE: 0.4 K/UL (ref 1–5.1)
LYMPHOCYTES RELATIVE PERCENT: 3.6 %
MAGNESIUM: 2.1 MG/DL (ref 1.8–2.4)
MCH RBC QN AUTO: 29.1 PG (ref 26–34)
MCHC RBC AUTO-ENTMCNC: 32.4 G/DL (ref 31–36)
MCV RBC AUTO: 89.9 FL (ref 80–100)
METHEMOGLOBIN VENOUS: 0.8 %
MONOCYTES ABSOLUTE: 1.2 K/UL (ref 0–1.3)
MONOCYTES RELATIVE PERCENT: 10.3 %
NEUTROPHILS ABSOLUTE: 9.3 K/UL (ref 1.7–7.7)
NEUTROPHILS RELATIVE PERCENT: 79.6 %
O2 CONTENT, VEN: 17 ML/DL
O2 SAT, VEN: 99 %
O2 THERAPY: ABNORMAL
PCO2, VEN: 58.6 MMHG (ref 40–50)
PDW BLD-RTO: 18.7 % (ref 12.4–15.4)
PH VENOUS: 7.28 (ref 7.35–7.45)
PHOSPHORUS: 3.6 MG/DL (ref 2.5–4.9)
PLATELET # BLD: 574 K/UL (ref 135–450)
PMV BLD AUTO: 7.2 FL (ref 5–10.5)
PO2, VEN: 150 MMHG
POTASSIUM SERPL-SCNC: 4 MMOL/L (ref 3.5–5.1)
PRO-BNP: ABNORMAL PG/ML (ref 0–449)
RBC # BLD: 4.32 M/UL (ref 4–5.2)
SODIUM BLD-SCNC: 139 MMOL/L (ref 136–145)
TCO2 CALC VENOUS: 28 MMOL/L
WBC # BLD: 11.7 K/UL (ref 4–11)

## 2019-06-26 PROCEDURE — 97116 GAIT TRAINING THERAPY: CPT

## 2019-06-26 PROCEDURE — 6370000000 HC RX 637 (ALT 250 FOR IP): Performed by: NURSE PRACTITIONER

## 2019-06-26 PROCEDURE — 2580000003 HC RX 258: Performed by: NURSE PRACTITIONER

## 2019-06-26 PROCEDURE — 94760 N-INVAS EAR/PLS OXIMETRY 1: CPT

## 2019-06-26 PROCEDURE — 97110 THERAPEUTIC EXERCISES: CPT

## 2019-06-26 PROCEDURE — 80069 RENAL FUNCTION PANEL: CPT

## 2019-06-26 PROCEDURE — 99232 SBSQ HOSP IP/OBS MODERATE 35: CPT | Performed by: INTERNAL MEDICINE

## 2019-06-26 PROCEDURE — 6360000002 HC RX W HCPCS: Performed by: INTERNAL MEDICINE

## 2019-06-26 PROCEDURE — 97530 THERAPEUTIC ACTIVITIES: CPT

## 2019-06-26 PROCEDURE — 36415 COLL VENOUS BLD VENIPUNCTURE: CPT

## 2019-06-26 PROCEDURE — 6360000002 HC RX W HCPCS: Performed by: NURSE PRACTITIONER

## 2019-06-26 PROCEDURE — 2580000003 HC RX 258: Performed by: INTERNAL MEDICINE

## 2019-06-26 PROCEDURE — 6370000000 HC RX 637 (ALT 250 FOR IP): Performed by: INTERNAL MEDICINE

## 2019-06-26 PROCEDURE — 82803 BLOOD GASES ANY COMBINATION: CPT

## 2019-06-26 PROCEDURE — APPNB15 APP NON BILLABLE TIME 0-15 MINS: Performed by: NURSE PRACTITIONER

## 2019-06-26 PROCEDURE — 85025 COMPLETE CBC W/AUTO DIFF WBC: CPT

## 2019-06-26 PROCEDURE — 94668 MNPJ CHEST WALL SBSQ: CPT

## 2019-06-26 PROCEDURE — 83735 ASSAY OF MAGNESIUM: CPT

## 2019-06-26 PROCEDURE — 2700000000 HC OXYGEN THERAPY PER DAY

## 2019-06-26 PROCEDURE — 97535 SELF CARE MNGMENT TRAINING: CPT

## 2019-06-26 PROCEDURE — 36592 COLLECT BLOOD FROM PICC: CPT

## 2019-06-26 PROCEDURE — 83880 ASSAY OF NATRIURETIC PEPTIDE: CPT

## 2019-06-26 PROCEDURE — 2060000000 HC ICU INTERMEDIATE R&B

## 2019-06-26 RX ORDER — FUROSEMIDE 10 MG/ML
40 INJECTION INTRAMUSCULAR; INTRAVENOUS 2 TIMES DAILY
Status: DISCONTINUED | OUTPATIENT
Start: 2019-06-26 | End: 2019-06-30

## 2019-06-26 RX ADMIN — Medication 2 CAPSULE: at 08:22

## 2019-06-26 RX ADMIN — AMITRIPTYLINE HYDROCHLORIDE 10 MG: 10 TABLET, FILM COATED ORAL at 20:09

## 2019-06-26 RX ADMIN — Medication 10 ML: at 08:26

## 2019-06-26 RX ADMIN — FOLIC ACID 1 MG: 1 TABLET ORAL at 08:22

## 2019-06-26 RX ADMIN — LEVOTHYROXINE SODIUM 137 MCG: 25 TABLET ORAL at 08:31

## 2019-06-26 RX ADMIN — BUPROPION HYDROCHLORIDE 150 MG: 150 TABLET, FILM COATED, EXTENDED RELEASE ORAL at 08:22

## 2019-06-26 RX ADMIN — POLYETHYLENE GLYCOL, PROPYLENE GLYCOL 2 DROP: .4; .3 LIQUID OPHTHALMIC at 08:20

## 2019-06-26 RX ADMIN — Medication 2 CAPSULE: at 18:00

## 2019-06-26 RX ADMIN — FLUOXETINE 20 MG: 20 CAPSULE ORAL at 08:22

## 2019-06-26 RX ADMIN — HEPARIN SODIUM 5000 UNITS: 5000 INJECTION INTRAVENOUS; SUBCUTANEOUS at 13:13

## 2019-06-26 RX ADMIN — GUAIFENESIN 600 MG: 600 TABLET, EXTENDED RELEASE ORAL at 08:22

## 2019-06-26 RX ADMIN — GUAIFENESIN 600 MG: 600 TABLET, EXTENDED RELEASE ORAL at 20:09

## 2019-06-26 RX ADMIN — PREDNISOLONE ACETATE 1 DROP: 1.2 SUSPENSION/ DROPS OPHTHALMIC at 20:28

## 2019-06-26 RX ADMIN — PIPERACILLIN SODIUM,TAZOBACTAM SODIUM 3.38 G: 3; .375 INJECTION, POWDER, FOR SOLUTION INTRAVENOUS at 09:10

## 2019-06-26 RX ADMIN — PIPERACILLIN SODIUM,TAZOBACTAM SODIUM 3.38 G: 3; .375 INJECTION, POWDER, FOR SOLUTION INTRAVENOUS at 01:18

## 2019-06-26 RX ADMIN — PREDNISONE 5 MG: 5 TABLET ORAL at 08:22

## 2019-06-26 RX ADMIN — HEPARIN SODIUM 5000 UNITS: 5000 INJECTION INTRAVENOUS; SUBCUTANEOUS at 06:17

## 2019-06-26 RX ADMIN — FUROSEMIDE 40 MG: 10 INJECTION, SOLUTION INTRAMUSCULAR; INTRAVENOUS at 18:00

## 2019-06-26 RX ADMIN — FUROSEMIDE 40 MG: 10 INJECTION, SOLUTION INTRAMUSCULAR; INTRAVENOUS at 08:22

## 2019-06-26 RX ADMIN — HEPARIN SODIUM 5000 UNITS: 5000 INJECTION INTRAVENOUS; SUBCUTANEOUS at 20:09

## 2019-06-26 RX ADMIN — POLYETHYLENE GLYCOL, PROPYLENE GLYCOL 2 DROP: .4; .3 LIQUID OPHTHALMIC at 20:14

## 2019-06-26 RX ADMIN — PIPERACILLIN SODIUM,TAZOBACTAM SODIUM 3.38 G: 3; .375 INJECTION, POWDER, FOR SOLUTION INTRAVENOUS at 18:00

## 2019-06-26 RX ADMIN — ROPINIROLE HYDROCHLORIDE 0.25 MG: 0.5 TABLET, FILM COATED ORAL at 20:09

## 2019-06-26 RX ADMIN — Medication 10 ML: at 20:20

## 2019-06-26 RX ADMIN — Medication 10 ML: at 20:10

## 2019-06-26 ASSESSMENT — PAIN SCALES - GENERAL
PAINLEVEL_OUTOF10: 0
PAINLEVEL_OUTOF10: 0

## 2019-06-26 NOTE — CARE COORDINATION
Met w/pt to address barriers to dc. HOME:  Pt lives with son in a 2 MENDY, 1 level home. Pt's spouse is in a snf. There are 2 daughters in the area who can assist as well    DME: cane    ACTIVE SERVICES: none     Discussed therapy recs of 2-3 sessions/week. Provided [facility] list and pt chose [facility]. Referral called to [facility], [phone]. TRANSPORTATION: [facility]    PHARMACY: [facility], denies difficulty obtaining/taking meds.  They are not interested in meds to beds    PCP: Elenora Kayser    DEMOGRAPHICS: verified address/phone number as correct    [facility]     HD/PD/O2: no cpap/bipap/o2 at home    THERAPY RECS: [facility] level 3-pt/ot    PLAN: [facility] level 3 for sn/pt/ot    Home care order and roxie needed at Naveen Shirley, RN  Case management  [phone]

## 2019-06-26 NOTE — PROGRESS NOTES
ADVANCED CARE PLANNING    Name:Paige Eckert       :  1940              MRN:  [id number]  Admission Date: 2019  4:41 PM  Date of Discussion: 19      Purpose of Encounter: Advanced care planning in light of Resp failure/acute on chr congestive heart failure. Parties in attendance: :Charo Nunn MD, Family members:daughter. Decisional Capacity:Yes      Objective/Medical Story: Pt admitted with SOB, found to be in acute on chr congestive heart failure and UTI and renal failure. Cardio and nephrology were consulted. Initially started on IV fluids but had a rapid response today 2/2 lethargy. CXR showed fluid overload and fluids were discontinued and started on lasix. Active Diagnoses: Active Hospital Problems    Diagnosis Date Noted    Mild malnutrition ([facility] [E44.1] 2019    Atrial tachycardia ([facility]) [I47.1]     NOBLE (acute kidney injury) ([facility] [N17.9]     PSVT (paroxysmal supraventricular tachycardia) ([facility]) [I47.1]     Demand ischemia ([facility]) [I24.8]     Acute on chronic diastolic heart failure (HCC) [I50.33]     Acute respiratory failure with hypoxia ([facility]) [J96.01]     Aortic valve stenosis [I35.0]     NSTEMI (non-ST elevated myocardial infarction) ([facility] [I21.4] 2019    Cellulitis of left upper extremity [L03.114] 2019    Acute cystitis without hematuria [N30.00] 2019    Leukocytosis [D72.829] 2019    CHF (congestive heart failure), NYHA class I, acute on chronic, combined ([facility] [I50.43] 2019    CKD (chronic kidney disease) stage 2, GFR 60-89 ml/min [N18.2] 2015         Subjective/Patient Story: Patient understands that his/her cardiac/renal function continues to deteriorate. Patient wishes to continue further interventions, patient will re-evaluate at a later time: Yes    Goals of Care Determinations: Patient wishes to focus on treatment. Code Status:  At this time patient wishes to be full

## 2019-06-26 NOTE — PROGRESS NOTES
Physical Therapy  Facility/Department: [address] ICU  Daily Treatment Note  NAME: David Mcdonald  : 1940  MRN: [id number]    Date of Service: 2019    Discharge Recommendations:  Continue to assess pending progress   PT Equipment Recommendations  Other: Will monitor for potential equipt needs. Alice Eckert scored a 17/24 on the AM-PAC short mobility form. Current research shows that an AM-PAC score of 18 or greater is typically associated with a discharge to the patient's home setting. Based on the patients AM-PAC score and their current functional mobility deficits, it is recommended that the patient have 2-3 sessions per week of Physical Therapy at d/c to increase the patients independence. Assessment   Body structures, Functions, Activity limitations: Decreased functional mobility ; Decreased endurance  Assessment: [de-identified] y/o female admit 19 with NSTEMI, NOBLE. PMH as noted including CKD, CHF, CML, RA, Chronic Idiopathic Gout, B TKRs, Chronic Pain. PTA pt living with  (currently in SNF setting) and son (able to provide assist/support) in home iwth 2 step to enter and bed/bath on 1st floor. PTA pt reports able to complete daily care and functional mobility (with cane) independently. Pt reports adequate assist/support by son for d/c home. Will recommend Home PT Services; will monitor pt's progress. Prognosis: Good  Decision Making: Medium Complexity  History: [de-identified] y/o female admit 19 with NSTEMI, NOBLE. PMH as noted including CKD, CHF, CML, RA, Chronic Idiopathic Gout, B TKRs, Chronic Pain. Patient Education: Role of PT, POC, Need to call for assist, LE Ex, Safe use of Walker. REQUIRES PT FOLLOW UP: Yes  Activity Tolerance  Activity Tolerance: Patient Tolerated treatment well;Patient limited by endurance     Patient Diagnosis(es): The primary encounter diagnosis was NSTEMI (non-ST elevated myocardial infarction) ([address]  Diagnoses of NOBLE (acute kidney injury) ([address] Dehydration, Leukocytosis, unspecified type, Elevated brain natriuretic peptide (BNP) level, and Acute cystitis with hematuria were also pertinent to this visit. has a past medical history of Acquired hypothyroidism, Anxiety and depression, Arthritis, rheumatoid (HCC), Chronic idiopathic gout of multiple sites, Chronic pain syndrome, CKD (chronic kidney disease) stage 2, GFR 60-89 ml/min, CML (chronic myelocytic leukemia) ([facility] Congenital heart disease, Depression, H/O: CML (chronic myeloid leukemia), History of CHF (congestive heart failure), Hypothyroid, and Rheumatoid arthritis of multiple sites with negative rheumatoid factor ([facility] has a past surgical history that includes Total knee arthroplasty (Bilateral, 2008); Gallbladder surgery; and Cholecystectomy. Restrictions  Restrictions/Precautions  Restrictions/Precautions: Fall Risk  Position Activity Restriction  Other position/activity restrictions: O2 5L via NC. Subjective   General  Chart Reviewed: Yes  Additional Pertinent Hx: [de-identified] y/o female admit 6/20/19 with NSTEMI, NOBLE. PMH as noted including CKD, CHF, CML, RA, Chronic Idiopathic Gout, B TKRs, Chronic Pain. Response To Previous Treatment: Patient with no complaints from previous session. Family / Caregiver Present: No  Referring Practitioner: Ministerio Ramos NP. Subjective  Subjective: Pt agreeable to PT Rx. Pt denies any pain at this time. Orientation  Orientation  Overall Orientation Status: Within Functional Limits  Cognition   Cognition  Overall Cognitive Status: WFL  Objective   Bed mobility  Supine to Sit: Minimal assistance(HOB elevated. Use of bedrail. )  Transfers  Sit to Stand: Minimal Assistance(With Walker. Cues for safe hand placement. )  Stand to sit: Contact guard assistance(With Walker. Cues for safe hand placement. )  Ambulation  Ambulation?: Yes  Ambulation 1  Surface: level tile  Device: Rolling Walker  Distance: 10' with Walker bed to chair with CGA/Min.    Pt able

## 2019-06-26 NOTE — DISCHARGE INSTR - COC
[P.O.:240; I.V.:30; IV Piggyback:200]  Out: 2200 [Urine:2200]    Safety Concerns:     [address] [facility] Safety Concerns:503658932}    Impairments/Disabilities:      {[facility] Impairments/Disabilities:013390698}    Nutrition Therapy:  Current Nutrition Therapy:   [address] [place] [address] DIANE Diet List:834030226}    Routes of Feeding: {CHP DME Other Feedings:646157633}  Liquids: {Slp liquid thickness:62831}  Daily Fluid Restriction: {CHP DME Yes amt example:605459287}  Last Modified Barium Swallow with Video (Video Swallowing Test): {Done Not Done UPZE:508769913}    Treatments at the Time of Hospital Discharge:   Respiratory Treatments: ***  Oxygen Therapy:  {Therapy; copd oxygen:32101}  Ventilator:    {[facility] Vent FPQJ:552899178}     Heart Failure Instructions:  Patient has diastolic heart failure. She will require the following:     Please weigh daily ( same scale, same time of day)  Please report weight gain of 3 pounds / one day or 5 pounds / one week to [facility] ([phone]). Please use hospital discharge weight as baseline reference. Please monitor for s/sx of worsening HF: sudden weight gain, SOB, LE edema, abdominal distention, inability to lie flat, intolerance to usual activity, cough (especially at night)  Please report these findings even if no increase is noted on scale. Please continue LOW SODIUM diet and LIMIT FLUIDS to 48-64 oz /day     Please call [facility] ([phone]) and /or [facility] ([phone]) with any questions or concerns. Please see [facility] for hospital follow up appointment details.       Rehab Therapies: Physical Therapy, Occupational Therapy and Nurse  Weight Bearing Status/Restrictions: [facility] [place]  Weight Bearin}  Other Medical Equipment (for information only, NOT a DME order):  {EQUIPMENT:040466067}  Other Treatments: HOME HEALTH CARE: LEVEL 3 [address] [place] [address] to establish plan of care for patient over 60 day period   Nursing  Initial home SN evaluation visit to occur within 24-48 hours for:  1)  medication management  2)  VS and clinical assessment  3)  S&S chronic disease exacerbation education + when to contact MD/NP  4)  care coordination  Medication Reconciliation during 1st SN visit  PT/OT/Speech   Evaluations in home within 24-48 hours of discharge to include DME and home safety   Frontload therapy 5 days, then 3x a week   OT to evaluate if patient has [address] needs for personal care    evaluation within 24-48 hours to evaluate resources & insurance for potential AL, [place], LTC, and Medicaid options   Palliative Care referral within 5 days of hospital discharge   PCP Visit scheduled within 3 - 7 days of hospital discharge [facility] (If patient is agreeable and meets guidelines)    Patient's personal belongings (please select all that are sent with patient):  {CHP DME Belongings:777915273}    RN SIGNATURE:  {Esignature:448940741}    CASE MANAGEMENT/SOCIAL WORK SECTION    Inpatient Status Date: 6/20/19    Readmission Risk Assessment Score: 24    Discharging to Facility/ Agency   Name:  [facility] care    Address: [address] [place] [address], [address] [place] [address]  Phone: [phone]  Fax: [phone]    / signature Electronically signed by MEGAN Mcconnell, APRILW on 6/28/19 at 3:05 PM      PHYSICIAN SECTION    Prognosis: Fair    Condition at Discharge: Stable    Rehab Potential (if transferring to Rehab): Good    Recommended Labs or Other Treatments After Discharge: Home care and VNS service. Physician Certification: I certify the above information and transfer of Oleksandr Metcalf  is necessary for the continuing treatment of the diagnosis listed and that she requires [address] for greater 30 days.      Update Admission H&P: No change in H&P    PHYSICIAN SIGNATURE:  Electronically signed by Love Addison MD on 7/2/19 at 11:42 AM

## 2019-06-26 NOTE — PROGRESS NOTES
ADVANCED CARE PLANNING    Name:Paige Eckert       :  1940              MRN:  [id number]  Admission Date: 2019  4:41 PM  Date of Discussion:        Purpose of Encounter: Advanced care planning in light of acute hypoxic respiratory failure . Parties in attendance: :Wiliam Jaime, Megan Verduzco DO, Family members: son. Decisional Capacity:Yes      Objective/Medical Story:  an [de-identified] yo female admitted with acute hypoxic respiratory failure, uti, and chf exacerbation. She was treated with iv lasix. On 19 a RR was called for pt being lethargic, hypotensive with sbp in the low 60s, hypothermic at 95 rectal. bp improved with trendelenburg but still low in the 80s. She was transferred to the icu on bipap. The next day she had improved on 5L O2. Today she is improved on 1L O2. Active Diagnoses: Active Hospital Problems    Diagnosis Date Noted    Mild malnutrition ([facility] [E44.1] 2019    Atrial tachycardia (HCC) [I47.1]     NOBLE (acute kidney injury) ([facility] [N17.9]     PSVT (paroxysmal supraventricular tachycardia) (HCC) [I47.1]     Demand ischemia (HCC) [I24.8]     Acute on chronic diastolic heart failure (HCC) [I50.33]     Acute respiratory failure with hypoxia (HCC) [J96.01]     Aortic valve stenosis [I35.0]     NSTEMI (non-ST elevated myocardial infarction) ([facility] [I21.4] 2019    Cellulitis of left upper extremity [L03.114] 2019    Acute cystitis without hematuria [N30.00] 2019    Leukocytosis [D72.829] 2019    CHF (congestive heart failure), NYHA class I, acute on chronic, combined ([facility] [I50.43] 2019    CKD (chronic kidney disease) stage 2, GFR 60-89 ml/min [N18.2] 2015         Subjective/Patient Story: Patient understands that her function continues to deteriorate.     Patient no longer wishes further curative interventions, including hospitalization, if there is no long term benefit :No  Patient wishes to continue further

## 2019-06-26 NOTE — PROGRESS NOTES
mouth daily    Yes Historical Provider, MD   levothyroxine (SYNTHROID) 137 MCG tablet Take 137 mcg by mouth Daily    Yes Historical Provider, MD   artificial tears 0.1-0.3 % SOLN ophthalmic solution 2 drops 3 times daily as needed   Yes Historical Provider, MD   etanercept (ENBREL) 50 MG/ML injection Inject into the skin once a week    Yes Historical Provider, MD   prednisoLONE acetate (PRED MILD) 0.12 % ophthalmic suspension Place 1 drop into both eyes 3 times daily    Yes Historical Provider, MD        CURRENT Medications:    furosemide (LASIX) injection 40 mg BID   sodium chloride flush 0.9 % injection 10 mL 2 times per day   sodium chloride flush 0.9 % injection 10 mL PRN   lactobacillus (CULTURELLE) capsule 2 capsule BID WC   piperacillin-tazobactam (ZOSYN) 3.375 g in dextrose 5 % 100 mL IVPB extended infusion (mini-bag) Q8H   guaiFENesin (MUCINEX) extended release tablet 600 mg BID   HYDROcodone-acetaminophen (NORCO)  MG per tablet 1 tablet Q6H PRN   heparin (porcine) injection 5,000 Units 3 times per day   sodium chloride flush 0.9 % injection 10 mL 2 times per day   sodium chloride flush 0.9 % injection 10 mL PRN   ondansetron (ZOFRAN) injection 4 mg Q6H PRN   acetaminophen (TYLENOL) tablet 650 mg Q4H PRN   amitriptyline (ELAVIL) tablet 10 mg Nightly   buPROPion (WELLBUTRIN XL) extended release tablet 150 mg Daily   dasatinib (SPRYCEL) chemo tablet 50 mg Daily   FLUoxetine (PROZAC) capsule 20 mg Daily   folic acid (FOLVITE) tablet 1 mg Daily   levothyroxine (SYNTHROID) tablet 137 mcg Daily   prednisoLONE acetate (PRED MILD) 0.12 % ophthalmic suspension 1 drop TID   predniSONE (DELTASONE) tablet 5 mg Daily   rOPINIRole (REQUIP) tablet 0.25 mg Nightly   polyethyl glycol-propyl glycol 0.4-0.3 % (SYSTANE) ophthalmic solution 2 drop PRN       Allergies:  Latex and Sulfa antibiotics     Review of Systems:  Constitutional: No unanticipated weight loss.  There's been no change in energy level, sleep pattern, or

## 2019-06-26 NOTE — PROGRESS NOTES
Pulmonary Progress Note    Date of Admission: 6/20/2019   LOS: 6 days     CC:  Chief Complaint   Patient presents with    Other     not eating or drinking normally cant remember when she ate last        HPI/Subjective  -Noncompliant with BiPAP of the night, sleepy this morning  Good response to empiric diuretics yesterday, however mental status remains poor. She awakens briefly to answer questions, but otherwise sleeps. ROS:   No nausea  No Vomiting  No chest pain      Intake/Output Summary (Last 24 hours) at 6/26/2019 1011  Last data filed at 6/26/2019 0835  Gross per 24 hour   Intake 570 ml   Output 2975 ml   Net -2405 ml         PHYSICAL EXAM:   Blood pressure (!) 145/65, pulse 84, temperature 97.5 °F (36.4 °C), temperature source Tympanic, resp. rate 22, height 5' 5\" (1.651 m), weight 181 lb 3.5 oz (82.2 kg), SpO2 97 %, not currently breastfeeding.'  Gen:  No acute distress. Lethargic  Eyes: PERRL. Anicteric sclera. No conjunctival injection. ENT: No discharge. Posterior oropharynx clear. External appearance of ears and nose normal.  Neck: Trachea midline. No mass   Resp: Bibasilar crackles. No wheezes. No rhonchi. No dullness on percussion. CV: Regular rate. Irregular rhythm. 3/6 systolic ejection murmur. . No edema. GI: Soft, Non-tender. Non-distended. +BS  Skin: Warm, dry, w/o erythema. Lymph: No cervical or supraclavicular LAD. M/S: No cyanosis. No clubbing. Neuro: no focal neurologic deficit. Moves all extremities  Psych: Somnolent, will awaken to touch.       Medications:    Scheduled Meds:   furosemide  40 mg Intravenous BID    sodium chloride flush  10 mL Intravenous 2 times per day    lactobacillus  2 capsule Oral BID WC    [Held by provider] metoprolol tartrate  12.5 mg Oral BID    piperacillin-tazobactam  3.375 g Intravenous Q8H    guaiFENesin  600 mg Oral BID    heparin (porcine)  5,000 Units Subcutaneous 3 times per day    sodium chloride flush  10 mL Intravenous 2 times

## 2019-06-26 NOTE — PROGRESS NOTES
Occupational Therapy  Facility/Department: [facility] ICU  Daily Treatment Note  NAME: Gisel Covarrubias  : 1940  MRN: [id number]    Date of Service: 2019    Discharge Recommendations:  24 hour supervision or assist, Home with Home health OT, S Level 3     Gisel Covarrubias scored a 19/24 on the AM-PAC ADL Inpatient form. Current research shows that an AM-PAC score of 18 or greater is typically associated with a discharge to the patient's home setting. Based on the patients AM-PAC score and their current ADL deficits, it is recommended that the patient have 2-3 sessions per week of Occupational Therapy at d/c to increase the patients independence. Assessment   Performance deficits / Impairments: Decreased functional mobility ; Decreased ADL status; Decreased safe awareness;Decreased strength;Decreased endurance;Decreased ROM; Decreased balance  Assessment: Pt tolerated tx session fair this date, continues to be limited by decreased strength and endurance. Pt performed fxl transfers from recliner with min A/CGA and RW, fxl mobility from recliner > < BR with CGA and RW. Pt performed grooming in stance at the sink ~10 mins. Pt performed B UE therex seated in recliner to increase strength and endurance and tolerated fair. Pt limited by decreased endurance this session but O2 sats remained above 92% on 5L O2. Continue per OT POC  Treatment Diagnosis: decreased ADLs, decreased fxl transfers  Prognosis: Fair  REQUIRES OT FOLLOW UP: Yes  Activity Tolerance  Activity Tolerance: Patient limited by fatigue  Safety Devices  Safety Devices in place: Yes  Type of devices: Call light within reach; Left in chair;Gait belt;Nurse notified; Patient at risk for falls         Patient Diagnosis(es): The primary encounter diagnosis was NSTEMI (non-ST elevated myocardial infarction) ([facility] [address]  Diagnoses of NOBLE (acute kidney injury) ([facility] [address] Dehydration, Leukocytosis, unspecified type, Elevated brain natriuretic peptide (BNP) level, and

## 2019-06-26 NOTE — PROGRESS NOTES
use  Cardiovascular:  Regular rate and rhythm without murmurs, rubs or gallops. Abdomen: Soft, non-tender, non-distended, without rebound or guarding. Normal bowel sounds. Musculoskeletal:  No clubbing, cyanosis. No edema  Skin: Skin color, texture, turgor normal.  Patient has an erythematous area to the dorsal aspect of the left hand approximately 5 cm in diameter, improving  Neurologic:  Neurovascularly intact without any focal sensory/motor deficits. Cranial nerves: II-XII intact, grossly non-focal.   Psychiatric:  alert, oriented to name, place, month, year  Capillary Refill: Brisk,< 3 seconds   Peripheral Pulses: +2 palpable, equal bilaterally    Labs:   Recent Labs     06/24/19  1033 06/25/19  0612 06/26/19  0620   WBC 12.7* 14.7* 11.7*   HGB 13.0 12.0 12.6   HCT 40.3 38.3 38.8   * 631* 574*     Recent Labs     06/24/19  0645 06/25/19  0612 06/26/19  0620   * 140 139   K 4.7 4.6 4.0   CL 97* 104 101   CO2 20* 23 26   BUN 52* 57* 45*   CREATININE 2.0* 2.0* 1.4*   CALCIUM 9.0 8.6 8.9   PHOS  --  4.7 3.6     No results for input(s): AST, ALT, BILIDIR, BILITOT, ALKPHOS in the last 72 hours. No results for input(s): INR in the last 72 hours. No results for input(s): Rusty Ganser in the last 72 hours. Urinalysis:      Lab Results   Component Value Date    NITRU Negative 06/20/2019    WBCUA 33 06/20/2019    RBCUA 0-2 06/20/2019    BLOODU Negative 06/20/2019    SPECGRAV 1.023 06/20/2019    GLUCOSEU Negative 06/20/2019       Radiology:  XR CHEST PORTABLE   Final Result   Right PICC with tip projecting in the region lower SVC, new. XR CHEST PORTABLE   Final Result   No significant change over the past 48 hours. Bilateral advanced pulmonary   edema, and left pleural effusion with adjacent compressive atelectasis in the   left lung base. CT ABDOMEN PELVIS WO CONTRAST Additional Contrast? None   Final Result   1.  Nonspecific bilateral pleural effusion with bibasilar consolidation

## 2019-06-27 LAB
ALBUMIN SERPL-MCNC: 2.7 G/DL (ref 3.4–5)
ANION GAP SERPL CALCULATED.3IONS-SCNC: 13 MMOL/L (ref 3–16)
ANISOCYTOSIS: ABNORMAL
ATYPICAL LYMPHOCYTE RELATIVE PERCENT: 1 % (ref 0–6)
BANDED NEUTROPHILS RELATIVE PERCENT: 5 % (ref 0–7)
BASOPHILS ABSOLUTE: 0.1 K/UL (ref 0–0.2)
BASOPHILS RELATIVE PERCENT: 1 %
BUN BLDV-MCNC: 35 MG/DL (ref 7–20)
CALCIUM SERPL-MCNC: 9.1 MG/DL (ref 8.3–10.6)
CHLORIDE BLD-SCNC: 102 MMOL/L (ref 99–110)
CO2: 28 MMOL/L (ref 21–32)
CREAT SERPL-MCNC: 1.1 MG/DL (ref 0.6–1.2)
EKG ATRIAL RATE: 91 BPM
EKG DIAGNOSIS: NORMAL
EKG P AXIS: 14 DEGREES
EKG P-R INTERVAL: 244 MS
EKG Q-T INTERVAL: 370 MS
EKG QRS DURATION: 102 MS
EKG QTC CALCULATION (BAZETT): 455 MS
EKG R AXIS: -42 DEGREES
EKG T AXIS: -81 DEGREES
EKG VENTRICULAR RATE: 91 BPM
EOSINOPHILS ABSOLUTE: 0.6 K/UL (ref 0–0.6)
EOSINOPHILS RELATIVE PERCENT: 5 %
GFR AFRICAN AMERICAN: 58
GFR NON-AFRICAN AMERICAN: 48
GLUCOSE BLD-MCNC: 100 MG/DL (ref 70–99)
HCT VFR BLD CALC: 39.8 % (ref 36–48)
HEMOGLOBIN: 12.6 G/DL (ref 12–16)
LYMPHOCYTES ABSOLUTE: 0.4 K/UL (ref 1–5.1)
LYMPHOCYTES RELATIVE PERCENT: 3 %
MAGNESIUM: 1.7 MG/DL (ref 1.8–2.4)
MCH RBC QN AUTO: 28.3 PG (ref 26–34)
MCHC RBC AUTO-ENTMCNC: 31.8 G/DL (ref 31–36)
MCV RBC AUTO: 89.1 FL (ref 80–100)
METAMYELOCYTES RELATIVE PERCENT: 2 %
MONOCYTES ABSOLUTE: 0.8 K/UL (ref 0–1.3)
MONOCYTES RELATIVE PERCENT: 7 %
NEUTROPHILS ABSOLUTE: 9.1 K/UL (ref 1.7–7.7)
NEUTROPHILS RELATIVE PERCENT: 76 %
PDW BLD-RTO: 18.4 % (ref 12.4–15.4)
PHOSPHORUS: 2.8 MG/DL (ref 2.5–4.9)
PLATELET # BLD: 535 K/UL (ref 135–450)
PLATELET SLIDE REVIEW: ABNORMAL
PMV BLD AUTO: 7.1 FL (ref 5–10.5)
POTASSIUM SERPL-SCNC: 3.6 MMOL/L (ref 3.5–5.1)
RBC # BLD: 4.46 M/UL (ref 4–5.2)
SLIDE REVIEW: ABNORMAL
SODIUM BLD-SCNC: 143 MMOL/L (ref 136–145)
WBC # BLD: 11 K/UL (ref 4–11)

## 2019-06-27 PROCEDURE — 6360000002 HC RX W HCPCS: Performed by: INTERNAL MEDICINE

## 2019-06-27 PROCEDURE — 94760 N-INVAS EAR/PLS OXIMETRY 1: CPT

## 2019-06-27 PROCEDURE — 2060000000 HC ICU INTERMEDIATE R&B

## 2019-06-27 PROCEDURE — 99232 SBSQ HOSP IP/OBS MODERATE 35: CPT | Performed by: NURSE PRACTITIONER

## 2019-06-27 PROCEDURE — 2580000003 HC RX 258: Performed by: INTERNAL MEDICINE

## 2019-06-27 PROCEDURE — 99233 SBSQ HOSP IP/OBS HIGH 50: CPT | Performed by: INTERNAL MEDICINE

## 2019-06-27 PROCEDURE — 93005 ELECTROCARDIOGRAM TRACING: CPT | Performed by: NURSE PRACTITIONER

## 2019-06-27 PROCEDURE — 80069 RENAL FUNCTION PANEL: CPT

## 2019-06-27 PROCEDURE — 85025 COMPLETE CBC W/AUTO DIFF WBC: CPT

## 2019-06-27 PROCEDURE — 2580000003 HC RX 258: Performed by: NURSE PRACTITIONER

## 2019-06-27 PROCEDURE — 97110 THERAPEUTIC EXERCISES: CPT

## 2019-06-27 PROCEDURE — 97530 THERAPEUTIC ACTIVITIES: CPT

## 2019-06-27 PROCEDURE — 6370000000 HC RX 637 (ALT 250 FOR IP): Performed by: INTERNAL MEDICINE

## 2019-06-27 PROCEDURE — 83735 ASSAY OF MAGNESIUM: CPT

## 2019-06-27 PROCEDURE — 2700000000 HC OXYGEN THERAPY PER DAY

## 2019-06-27 PROCEDURE — 93010 ELECTROCARDIOGRAM REPORT: CPT | Performed by: INTERNAL MEDICINE

## 2019-06-27 PROCEDURE — 6370000000 HC RX 637 (ALT 250 FOR IP): Performed by: NURSE PRACTITIONER

## 2019-06-27 PROCEDURE — 6360000002 HC RX W HCPCS: Performed by: NURSE PRACTITIONER

## 2019-06-27 PROCEDURE — 94669 MECHANICAL CHEST WALL OSCILL: CPT

## 2019-06-27 RX ORDER — SPIRONOLACTONE 25 MG/1
25 TABLET ORAL DAILY
Status: DISCONTINUED | OUTPATIENT
Start: 2019-06-27 | End: 2019-07-02 | Stop reason: HOSPADM

## 2019-06-27 RX ORDER — METOPROLOL SUCCINATE 25 MG/1
12.5 TABLET, EXTENDED RELEASE ORAL DAILY
Status: DISCONTINUED | OUTPATIENT
Start: 2019-06-27 | End: 2019-06-28

## 2019-06-27 RX ADMIN — FLUOXETINE 20 MG: 20 CAPSULE ORAL at 09:26

## 2019-06-27 RX ADMIN — LEVOTHYROXINE SODIUM 137 MCG: 25 TABLET ORAL at 06:55

## 2019-06-27 RX ADMIN — FOLIC ACID 1 MG: 1 TABLET ORAL at 09:26

## 2019-06-27 RX ADMIN — FUROSEMIDE 40 MG: 10 INJECTION, SOLUTION INTRAMUSCULAR; INTRAVENOUS at 15:51

## 2019-06-27 RX ADMIN — Medication 10 ML: at 21:24

## 2019-06-27 RX ADMIN — Medication 10 ML: at 21:17

## 2019-06-27 RX ADMIN — Medication 2 CAPSULE: at 09:26

## 2019-06-27 RX ADMIN — PIPERACILLIN SODIUM,TAZOBACTAM SODIUM 3.38 G: 3; .375 INJECTION, POWDER, FOR SOLUTION INTRAVENOUS at 02:13

## 2019-06-27 RX ADMIN — FUROSEMIDE 40 MG: 10 INJECTION, SOLUTION INTRAMUSCULAR; INTRAVENOUS at 09:26

## 2019-06-27 RX ADMIN — Medication 2 CAPSULE: at 15:51

## 2019-06-27 RX ADMIN — METOPROLOL SUCCINATE 12.5 MG: 25 TABLET, FILM COATED, EXTENDED RELEASE ORAL at 11:29

## 2019-06-27 RX ADMIN — HEPARIN SODIUM 5000 UNITS: 5000 INJECTION INTRAVENOUS; SUBCUTANEOUS at 15:52

## 2019-06-27 RX ADMIN — PIPERACILLIN SODIUM,TAZOBACTAM SODIUM 3.38 G: 3; .375 INJECTION, POWDER, FOR SOLUTION INTRAVENOUS at 09:34

## 2019-06-27 RX ADMIN — HEPARIN SODIUM 5000 UNITS: 5000 INJECTION INTRAVENOUS; SUBCUTANEOUS at 21:17

## 2019-06-27 RX ADMIN — AMITRIPTYLINE HYDROCHLORIDE 10 MG: 10 TABLET, FILM COATED ORAL at 21:17

## 2019-06-27 RX ADMIN — SPIRONOLACTONE 25 MG: 25 TABLET ORAL at 11:29

## 2019-06-27 RX ADMIN — HEPARIN SODIUM 5000 UNITS: 5000 INJECTION INTRAVENOUS; SUBCUTANEOUS at 05:23

## 2019-06-27 RX ADMIN — CEFTRIAXONE 2 G: 2 INJECTION, POWDER, FOR SOLUTION INTRAMUSCULAR; INTRAVENOUS at 15:50

## 2019-06-27 RX ADMIN — ROPINIROLE HYDROCHLORIDE 0.25 MG: 0.5 TABLET, FILM COATED ORAL at 21:18

## 2019-06-27 RX ADMIN — BUPROPION HYDROCHLORIDE 150 MG: 150 TABLET, FILM COATED, EXTENDED RELEASE ORAL at 09:26

## 2019-06-27 RX ADMIN — PREDNISOLONE ACETATE 1 DROP: 1.2 SUSPENSION/ DROPS OPHTHALMIC at 21:26

## 2019-06-27 RX ADMIN — GUAIFENESIN 600 MG: 600 TABLET, EXTENDED RELEASE ORAL at 21:18

## 2019-06-27 RX ADMIN — PREDNISONE 5 MG: 5 TABLET ORAL at 09:26

## 2019-06-27 RX ADMIN — GUAIFENESIN 600 MG: 600 TABLET, EXTENDED RELEASE ORAL at 09:26

## 2019-06-27 ASSESSMENT — PAIN SCALES - GENERAL
PAINLEVEL_OUTOF10: 0

## 2019-06-27 NOTE — PROGRESS NOTES
bowels  : Denies frequency, pain, incontinence, polyuria  VASC: Denies claudication, leg cramps, clots  MUSC/SKEL: Denies pain, stiffness, arthritis  PSYCH: Denies anxiety, depression, stress  NEURO: Denies numbness, tingling, weakness,change in mood or memory  HEME: Denies abn bruising, bleeding, anemia  ENDO: Denies intolerance to heat, cold, excessive thirst or hunger, hx thyroid disease    Objective:   /70   Pulse 92   Temp 97.5 °F (36.4 °C) (Oral)   Resp 16   Ht 5' 3\" (1.6 m)   Wt 167 lb 1.7 oz (75.8 kg)   SpO2 94%   BMI 29.60 kg/m²        Intake/Output Summary (Last 24 hours) at 6/27/2019 0855  Last data filed at 6/27/2019 1979  Gross per 24 hour   Intake 359.2 ml   Output 4525 ml   Net -4165.8 ml     I/O since adm:  -6,331    WEIGHT:Admit Weight: 200 lb 13.4 oz (91.1 kg) ED  Admit weight to floor 178#           Today  Weight: 167 lb 1.7 oz (75.8 kg)   DRY WEIGHT:  Wt Readings from Last 3 Encounters:   06/27/19 167 lb 1.7 oz (75.8 kg)   07/25/17 176 lb (79.8 kg)   07/17/17 175 lb (79.4 kg)       Physical Exam:  GEN: Appears aftigued, no acute distress  SKIN: Pink, warm, dry. Nails without clubbing. HEENT: PERRLA. Normocephalic, atraumatic. Neck supple. No adenopathy. LUNG: AP diameter normal. Crackles bilateral 1/2 up. No wheeze or ronchi. Respiratory effort normal at rest, some SOB with conversation. HEART: S1S2 A/R. No JVD. No carotid bruit. No murmur, rub or gallop. ABD: Soft, nontender. +BS X 4 quads. No hepatomegaly. EXT: Radial and pedal pulses 2+ and symmetric. Without varicosities. 1+BLE edema. MUSCSKEL: Good ROM X4 extremities. No deformity. NEURO: A/O X3. Calm and cooperative.      Telemetry: Prolonged WI, NSR with PACs, PVCs    Medications:    furosemide  40 mg Intravenous BID    sodium chloride flush  10 mL Intravenous 2 times per day    lactobacillus  2 capsule Oral BID WC    piperacillin-tazobactam  3.375 g Intravenous Q8H    guaiFENesin  600 mg Oral BID    heparin (hyperdynamic) with ejection   fraction estimated from 70 % to 75 %.   Diastolic filling parameters suggests grade I diastolic dysfunction .   Mitral annular calcification is present.   The mitral valve apparatus is calcified and no hemodynamically significant   stenosis.   Mild mitral regurgitation is present.   Severely dilated left atrium.   The aortic valve appears calcified, consistent with aortic stenosis.   Mild aortic regurgitation is present.  Angel Rashid is mild aortic stenosis, JOI by continuity 1.56 cm sq., Peak gradient   27 mmHg, Mean gradient 16 mmHg.   Tricuspid annular calcification.   There is moderate tricuspid regurgitation with RVSP estimated at 55 mmHg,   c/w mild to moderate pulmonary HTN.  Right atrial size is moderately to severely dilated .   The main pulmonary artery and bilateral branches are dilated.   Inferior vena cava appears normal.   IVC respiratory change in dimension > 50%.      3/26/2015  Summary:   1. Left ventricle: The cavity size is normal. Wall thickness is normal.      Systolic function is normal. The estimated ejection fraction is 55-60%.      Wall motion is normal; there are no regional wall motion abnormalities.      Doppler parameters are consistent with abnormal left ventricular      relaxation (grade 1 diastolic dysfunction). 2. Aortic valve: Thickening, consistent with sclerosis. Mild regurgitation. 3. Mitral valve: Mild regurgitation. 4. Left atrium: The atrium is markedly dilated. 5. Right ventricle: Estimation of the right ventricular systolic pressure is      mildly increased. RV systolic pressure: 92LI Hg (S, est). 6. Right atrium: The atrium is moderately dilated. 7. Tricuspid valve: Moderate regurgitation. 8. Pericardium, extracardiac: There is no significant pericardial effusion.    Prepared and signed by     Stress Test: none    Cardiac Angiography: None    CXR 6/24/19  FINDINGS:   Cardiac silhouette is enlarged.  Right PICC with tip projecting in the region   of the lower SVC.  No pneumothorax.  Small bilateral pleural effusions and   interstitial edema, similar to previous exam.           Impression   Right PICC with tip projecting in the region lower SVC, new. ABD CT 6/22/19  Impression   1. Nonspecific bilateral pleural effusion with bibasilar consolidation   unchanged from prior study. 2. Abdominal and pelvic ascites and anasarca presumably related to fluid   volume overload. 3. No urinary collecting system calculus evident. 4. Main pulmonary artery dilatation can be seen with pulmonary hypertension. 5. 10 mm hyperdense nodule inferior pole left kidney almost certainly   reflects hemorrhagic cyst.  Follow-up CT abdomen in 6-12 months recommended   to ensure stability. 6. Mild anasarca. 7. Calcific atherosclerotic disease aorta. 8. Small, fat and ascites containing inguinal hernias bilateral.   9. Moderate size hiatal hernia. 10. Advanced degenerative changes lumbar spine at L4-5 with grade 2   anterolisthesis L4 on L5.     RENAL US 6/21/19  Impression   Mild diffuse increased echogenicity of the kidneys consistent with medical   renal disease. CHEST CT 6/21/19  Impression   1. Interstitial pulmonary edema, moderate to large bilateral pleural   effusions, and mild cardiomegaly, suggesting congestive heart failure. 2. Passive atelectasis in the bilateral lower lobes.  Superimposed pneumonia   or aspiration may be present bilaterally. 3. Findings suggestive of pulmonary hypertension. 4. Perivascular stranding adjacent to the splenic hilum, an indeterminate   finding that could be related to underlying splenic vein thrombosis. Consider further evaluation with sonography or a contrast-enhanced abdomen   and pelvis CT.    5. Trace pericardial effusion and mild anasarca     [facility] [place] 7/30/2013  History: Shortness of breath, drug-induced congestive heart failure  Findings: Multiple images of the left ventricle in left

## 2019-06-27 NOTE — PROGRESS NOTES
Impression Findings most compatible with worsening or recurrent pulmonary edema. Correlate with any clinical evidence of superimposed infection. CXR portable:   Results for orders placed during the hospital encounter of 06/20/19   XR CHEST PORTABLE    Narrative EXAMINATION:  ONE XRAY VIEW OF THE CHEST    6/24/2019 4:26 pm    COMPARISON:  June 24, 2019 9:49    HISTORY:  ORDERING SYSTEM PROVIDED HISTORY: PICC line placement  TECHNOLOGIST PROVIDED HISTORY:  Reason for exam:->PICC line placement  Ordering Physician Provided Reason for Exam: picc placement    FINDINGS:  Cardiac silhouette is enlarged. Right PICC with tip projecting in the region  of the lower SVC. No pneumothorax. Small bilateral pleural effusions and  interstitial edema, similar to previous exam.      Impression Right PICC with tip projecting in the region lower SVC, new. Assessment:     Acute Hypoxemic Respiratory Failure with SAO2 <90% on Room Air  Acute CHF  Pulmonary edema  Sepsis   Acute cystitis, E. coli  Acute metabolic encephalopathy  NOBLE on CKD  Pulmonary hypertension  Atrial fibrillation - new        Plan:      Acute hypercapnia  - has bipap but has been refusing. Will leave in room for 24 more hours to monitor and have if needed. -    Acute CHF  -Fluid removal by cardiology      Acute kidney disease  -Improving      Acute hypoxemia  -Improving. Wean saturation to 90%      Acute cystitis  -Zosyn  -Pansensitive E. coli. Change to ceftriaxone             This note was transcribed using [id number] WebNotes. Please disregard any translational errors.       [facility] [phone]

## 2019-06-27 NOTE — PROGRESS NOTES
4 Eyes Skin Assessment     The patient is being assess for  Admission    I agree that 2 RN's have performed a thorough Head to Toe Skin Assessment on the patient. ALL assessment sites listed below have been assessed. Areas assessed by both nurses:Dacia/Damari  [x]   Head, Face, and Ears   [x]   Shoulders, Back, and Chest  [x]   Arms, Elbows, and Hands   [x]   Coccyx, Sacrum, and IschIum  [x]   Legs, Feet, and Heels        Does the Patient have Skin Breakdown?   Yes LDA WOUND CARE was Initiated documentation include the Geri-wound, Wound Assessment, Measurements, Dressing Treatment, Drainage, and Color\",         Ramon Prevention initiated:  Yes   Wound Care Orders initiated:  No      WOC nurse consulted for Pressure Injury (Stage 3,4, Unstageable, DTI, NWPT, and Complex wounds), New and Established Ostomies:  No      Nurse 1 eSignature: Electronically signed by Schuyler Chacon RN on 6/27/19 at 6:30 AM    **SHARE this note so that the co-signing nurse is able to place an eSignature**    Nurse 2 eSignature: Electronically signed by Kirsten Vann RN on 6/27/19 at 6:32 AM

## 2019-06-27 NOTE — PROGRESS NOTES
Hospitalist Progress Note      PCP: Sanya Victor    Date of Admission: 6/20/2019    Subjective: Pt S/E. Pt feels better today. More awake. On 1L O2. Medications:  Reviewed    Infusion Medications     Scheduled Medications    metoprolol succinate  12.5 mg Oral Daily    spironolactone  25 mg Oral Daily    cefTRIAXone (ROCEPHIN) IV  2 g Intravenous Q24H    furosemide  40 mg Intravenous BID    sodium chloride flush  10 mL Intravenous 2 times per day    lactobacillus  2 capsule Oral BID WC    guaiFENesin  600 mg Oral BID    heparin (porcine)  5,000 Units Subcutaneous 3 times per day    sodium chloride flush  10 mL Intravenous 2 times per day    amitriptyline  10 mg Oral Nightly    buPROPion  150 mg Oral Daily    dasatinib  50 mg Oral Daily    FLUoxetine  20 mg Oral Daily    folic acid  1 mg Oral Daily    levothyroxine  137 mcg Oral Daily    prednisoLONE acetate  1 drop Both Eyes TID    predniSONE  5 mg Oral Daily    rOPINIRole  0.25 mg Oral Nightly     PRN Meds: sodium chloride flush, HYDROcodone-acetaminophen, sodium chloride flush, ondansetron, acetaminophen, polyethyl glycol-propyl glycol 0.4-0.3 %      Intake/Output Summary (Last 24 hours) at 6/27/2019 1655  Last data filed at 6/27/2019 1250  Gross per 24 hour   Intake 959.2 ml   Output 3075 ml   Net -2115.8 ml       Physical Exam Performed:    /72   Pulse 93   Temp 97.8 °F (36.6 °C) (Oral)   Resp 18   Ht 5' 3\" (1.6 m)   Wt 167 lb 1.7 oz (75.8 kg)   SpO2 94%   BMI 29.60 kg/m²     General appearance:  nad, sitting up in a chair eating lunch  HEENT:  Normal cephalic, atraumatic without obvious deformity. Pupils equal, round, and reactive to light.  Extra ocular muscles intact. Neck: Supple, with full range of motion. No jugular venous distention. Respiratory: improved aeration, no wheezing, no accessory muscle use  Cardiovascular:  Regular rate and rhythm without murmurs, rubs or gallops.   Abdomen: Soft, non-tender, non-distended, without rebound or guarding. Normal bowel sounds. Musculoskeletal:  No clubbing, cyanosis. No edema  Skin: Skin color, texture, turgor normal.  Patient has an erythematous area to the dorsal aspect of the left hand approximately 5 cm in diameter, improving  Neurologic:  Neurovascularly intact without any focal sensory/motor deficits. Cranial nerves: II-XII intact, grossly non-focal.   Psychiatric:  alert, oriented x3  Capillary Refill: Brisk,< 3 seconds   Peripheral Pulses: +2 palpable, equal bilaterally    Labs:   Recent Labs     06/25/19  0612 06/26/19 0620 06/27/19  0537   WBC 14.7* 11.7* 11.0   HGB 12.0 12.6 12.6   HCT 38.3 38.8 39.8   * 574* 535*     Recent Labs     06/25/19  0612 06/26/19  0620 06/27/19  0537    139 143   K 4.6 4.0 3.6    101 102   CO2 23 26 28   BUN 57* 45* 35*   CREATININE 2.0* 1.4* 1.1   CALCIUM 8.6 8.9 9.1   PHOS 4.7 3.6 2.8     No results for input(s): AST, ALT, BILIDIR, BILITOT, ALKPHOS in the last 72 hours. No results for input(s): INR in the last 72 hours. No results for input(s): Shazia Jelena in the last 72 hours. Urinalysis:      Lab Results   Component Value Date    NITRU Negative 06/20/2019    WBCUA 33 06/20/2019    RBCUA 0-2 06/20/2019    BLOODU Negative 06/20/2019    SPECGRAV 1.023 06/20/2019    GLUCOSEU Negative 06/20/2019       Radiology:  XR CHEST PORTABLE   Final Result   Right PICC with tip projecting in the region lower SVC, new. XR CHEST PORTABLE   Final Result   No significant change over the past 48 hours. Bilateral advanced pulmonary   edema, and left pleural effusion with adjacent compressive atelectasis in the   left lung base. CT ABDOMEN PELVIS WO CONTRAST Additional Contrast? None   Final Result   1. Nonspecific bilateral pleural effusion with bibasilar consolidation   unchanged from prior study. 2. Abdominal and pelvic ascites and anasarca presumably related to fluid   volume overload.    3. No

## 2019-06-28 PROBLEM — I48.92 ATRIAL FLUTTER (HCC): Status: ACTIVE | Noted: 2019-06-28

## 2019-06-28 LAB
ALBUMIN SERPL-MCNC: 3 G/DL (ref 3.4–5)
ANION GAP SERPL CALCULATED.3IONS-SCNC: 14 MMOL/L (ref 3–16)
BASOPHILS ABSOLUTE: 0.2 K/UL (ref 0–0.2)
BASOPHILS RELATIVE PERCENT: 1 %
BUN BLDV-MCNC: 28 MG/DL (ref 7–20)
CALCIUM SERPL-MCNC: 9.3 MG/DL (ref 8.3–10.6)
CHLORIDE BLD-SCNC: 97 MMOL/L (ref 99–110)
CO2: 30 MMOL/L (ref 21–32)
CREAT SERPL-MCNC: 0.9 MG/DL (ref 0.6–1.2)
EKG ATRIAL RATE: 133 BPM
EKG DIAGNOSIS: NORMAL
EKG P-R INTERVAL: 96 MS
EKG Q-T INTERVAL: 394 MS
EKG QRS DURATION: 92 MS
EKG QTC CALCULATION (BAZETT): 586 MS
EKG R AXIS: -20 DEGREES
EKG T AXIS: 49 DEGREES
EKG VENTRICULAR RATE: 133 BPM
EOSINOPHILS ABSOLUTE: 0.7 K/UL (ref 0–0.6)
EOSINOPHILS RELATIVE PERCENT: 4.4 %
GFR AFRICAN AMERICAN: >60
GFR NON-AFRICAN AMERICAN: >60
GLUCOSE BLD-MCNC: 96 MG/DL (ref 70–99)
HCT VFR BLD CALC: 41.5 % (ref 36–48)
HEMOGLOBIN: 13.3 G/DL (ref 12–16)
LYMPHOCYTES ABSOLUTE: 0.4 K/UL (ref 1–5.1)
LYMPHOCYTES RELATIVE PERCENT: 2.5 %
MAGNESIUM: 1.4 MG/DL (ref 1.8–2.4)
MCH RBC QN AUTO: 28.5 PG (ref 26–34)
MCHC RBC AUTO-ENTMCNC: 32 G/DL (ref 31–36)
MCV RBC AUTO: 89 FL (ref 80–100)
MONOCYTES ABSOLUTE: 1.5 K/UL (ref 0–1.3)
MONOCYTES RELATIVE PERCENT: 9.6 %
NEUTROPHILS ABSOLUTE: 12.7 K/UL (ref 1.7–7.7)
NEUTROPHILS RELATIVE PERCENT: 82.5 %
PDW BLD-RTO: 18.8 % (ref 12.4–15.4)
PHOSPHORUS: 2.2 MG/DL (ref 2.5–4.9)
PLATELET # BLD: 597 K/UL (ref 135–450)
PMV BLD AUTO: 7 FL (ref 5–10.5)
POTASSIUM SERPL-SCNC: 3.5 MMOL/L (ref 3.5–5.1)
PRO-BNP: 4861 PG/ML (ref 0–449)
RBC # BLD: 4.67 M/UL (ref 4–5.2)
SODIUM BLD-SCNC: 141 MMOL/L (ref 136–145)
WBC # BLD: 15.4 K/UL (ref 4–11)

## 2019-06-28 PROCEDURE — 83735 ASSAY OF MAGNESIUM: CPT

## 2019-06-28 PROCEDURE — 6360000002 HC RX W HCPCS: Performed by: INTERNAL MEDICINE

## 2019-06-28 PROCEDURE — 6370000000 HC RX 637 (ALT 250 FOR IP): Performed by: INTERNAL MEDICINE

## 2019-06-28 PROCEDURE — 2700000000 HC OXYGEN THERAPY PER DAY

## 2019-06-28 PROCEDURE — 6370000000 HC RX 637 (ALT 250 FOR IP): Performed by: FAMILY MEDICINE

## 2019-06-28 PROCEDURE — 93010 ELECTROCARDIOGRAM REPORT: CPT | Performed by: INTERNAL MEDICINE

## 2019-06-28 PROCEDURE — 83880 ASSAY OF NATRIURETIC PEPTIDE: CPT

## 2019-06-28 PROCEDURE — 2060000000 HC ICU INTERMEDIATE R&B

## 2019-06-28 PROCEDURE — 97530 THERAPEUTIC ACTIVITIES: CPT

## 2019-06-28 PROCEDURE — 6360000002 HC RX W HCPCS: Performed by: FAMILY MEDICINE

## 2019-06-28 PROCEDURE — 6370000000 HC RX 637 (ALT 250 FOR IP): Performed by: NURSE PRACTITIONER

## 2019-06-28 PROCEDURE — 93005 ELECTROCARDIOGRAM TRACING: CPT | Performed by: NURSE PRACTITIONER

## 2019-06-28 PROCEDURE — 99232 SBSQ HOSP IP/OBS MODERATE 35: CPT | Performed by: NURSE PRACTITIONER

## 2019-06-28 PROCEDURE — 2580000003 HC RX 258: Performed by: NURSE PRACTITIONER

## 2019-06-28 PROCEDURE — 85025 COMPLETE CBC W/AUTO DIFF WBC: CPT

## 2019-06-28 PROCEDURE — 80069 RENAL FUNCTION PANEL: CPT

## 2019-06-28 PROCEDURE — 6360000002 HC RX W HCPCS: Performed by: NURSE PRACTITIONER

## 2019-06-28 PROCEDURE — 97530 THERAPEUTIC ACTIVITIES: CPT | Performed by: PHYSICAL THERAPIST

## 2019-06-28 PROCEDURE — 2500000003 HC RX 250 WO HCPCS

## 2019-06-28 PROCEDURE — 97116 GAIT TRAINING THERAPY: CPT | Performed by: PHYSICAL THERAPIST

## 2019-06-28 PROCEDURE — 99232 SBSQ HOSP IP/OBS MODERATE 35: CPT | Performed by: INTERNAL MEDICINE

## 2019-06-28 PROCEDURE — 94761 N-INVAS EAR/PLS OXIMETRY MLT: CPT

## 2019-06-28 PROCEDURE — 94669 MECHANICAL CHEST WALL OSCILL: CPT

## 2019-06-28 PROCEDURE — 2580000003 HC RX 258: Performed by: INTERNAL MEDICINE

## 2019-06-28 RX ORDER — LISINOPRIL 5 MG/1
5 TABLET ORAL DAILY
Status: DISCONTINUED | OUTPATIENT
Start: 2019-06-28 | End: 2019-06-28

## 2019-06-28 RX ORDER — POTASSIUM CHLORIDE 20 MEQ/1
20 TABLET, EXTENDED RELEASE ORAL ONCE
Status: COMPLETED | OUTPATIENT
Start: 2019-06-28 | End: 2019-06-28

## 2019-06-28 RX ORDER — DIGOXIN 0.25 MG/ML
250 INJECTION INTRAMUSCULAR; INTRAVENOUS
Status: COMPLETED | OUTPATIENT
Start: 2019-06-28 | End: 2019-06-28

## 2019-06-28 RX ORDER — DIGOXIN 125 MCG
125 TABLET ORAL DAILY
Status: DISCONTINUED | OUTPATIENT
Start: 2019-06-29 | End: 2019-07-02 | Stop reason: HOSPADM

## 2019-06-28 RX ORDER — MAGNESIUM SULFATE IN WATER 40 MG/ML
4 INJECTION, SOLUTION INTRAVENOUS ONCE
Status: COMPLETED | OUTPATIENT
Start: 2019-06-28 | End: 2019-06-28

## 2019-06-28 RX ORDER — METOPROLOL TARTRATE 5 MG/5ML
INJECTION INTRAVENOUS
Status: COMPLETED
Start: 2019-06-28 | End: 2019-06-28

## 2019-06-28 RX ORDER — METOPROLOL TARTRATE 5 MG/5ML
5 INJECTION INTRAVENOUS ONCE
Status: COMPLETED | OUTPATIENT
Start: 2019-06-28 | End: 2019-06-28

## 2019-06-28 RX ADMIN — AMITRIPTYLINE HYDROCHLORIDE 10 MG: 10 TABLET, FILM COATED ORAL at 21:21

## 2019-06-28 RX ADMIN — FOLIC ACID 1 MG: 1 TABLET ORAL at 08:27

## 2019-06-28 RX ADMIN — HEPARIN SODIUM 5000 UNITS: 5000 INJECTION INTRAVENOUS; SUBCUTANEOUS at 21:21

## 2019-06-28 RX ADMIN — PREDNISOLONE ACETATE 1 DROP: 1.2 SUSPENSION/ DROPS OPHTHALMIC at 08:32

## 2019-06-28 RX ADMIN — METOPROLOL SUCCINATE 12.5 MG: 25 TABLET, FILM COATED, EXTENDED RELEASE ORAL at 08:27

## 2019-06-28 RX ADMIN — PREDNISONE 5 MG: 5 TABLET ORAL at 08:27

## 2019-06-28 RX ADMIN — Medication 2 CAPSULE: at 08:27

## 2019-06-28 RX ADMIN — POTASSIUM CHLORIDE 20 MEQ: 20 TABLET, EXTENDED RELEASE ORAL at 14:50

## 2019-06-28 RX ADMIN — DIGOXIN 250 MCG: 0.25 INJECTION INTRAMUSCULAR; INTRAVENOUS at 15:02

## 2019-06-28 RX ADMIN — METOPROLOL TARTRATE 5 MG: 5 INJECTION INTRAVENOUS at 10:43

## 2019-06-28 RX ADMIN — GUAIFENESIN 600 MG: 600 TABLET, EXTENDED RELEASE ORAL at 08:27

## 2019-06-28 RX ADMIN — DIBASIC SODIUM PHOSPHATE, MONOBASIC POTASSIUM PHOSPHATE AND MONOBASIC SODIUM PHOSPHATE 1 TABLET: 852; 155; 130 TABLET ORAL at 21:20

## 2019-06-28 RX ADMIN — BUPROPION HYDROCHLORIDE 150 MG: 150 TABLET, FILM COATED, EXTENDED RELEASE ORAL at 08:27

## 2019-06-28 RX ADMIN — DIGOXIN 250 MCG: 0.25 INJECTION INTRAMUSCULAR; INTRAVENOUS at 17:32

## 2019-06-28 RX ADMIN — PREDNISOLONE ACETATE 1 DROP: 1.2 SUSPENSION/ DROPS OPHTHALMIC at 15:07

## 2019-06-28 RX ADMIN — LEVOTHYROXINE SODIUM 137 MCG: 25 TABLET ORAL at 05:07

## 2019-06-28 RX ADMIN — ROPINIROLE HYDROCHLORIDE 0.25 MG: 0.5 TABLET, FILM COATED ORAL at 21:21

## 2019-06-28 RX ADMIN — LISINOPRIL 5 MG: 5 TABLET ORAL at 10:12

## 2019-06-28 RX ADMIN — CEFTRIAXONE 2 G: 2 INJECTION, POWDER, FOR SOLUTION INTRAMUSCULAR; INTRAVENOUS at 14:49

## 2019-06-28 RX ADMIN — HEPARIN SODIUM 5000 UNITS: 5000 INJECTION INTRAVENOUS; SUBCUTANEOUS at 14:50

## 2019-06-28 RX ADMIN — HEPARIN SODIUM 5000 UNITS: 5000 INJECTION INTRAVENOUS; SUBCUTANEOUS at 05:07

## 2019-06-28 RX ADMIN — SPIRONOLACTONE 25 MG: 25 TABLET ORAL at 08:27

## 2019-06-28 RX ADMIN — Medication 10 ML: at 21:22

## 2019-06-28 RX ADMIN — DIBASIC SODIUM PHOSPHATE, MONOBASIC POTASSIUM PHOSPHATE AND MONOBASIC SODIUM PHOSPHATE 1 TABLET: 852; 155; 130 TABLET ORAL at 10:12

## 2019-06-28 RX ADMIN — FUROSEMIDE 40 MG: 10 INJECTION, SOLUTION INTRAMUSCULAR; INTRAVENOUS at 08:27

## 2019-06-28 RX ADMIN — FLUOXETINE 20 MG: 20 CAPSULE ORAL at 08:27

## 2019-06-28 RX ADMIN — Medication 10 ML: at 08:32

## 2019-06-28 RX ADMIN — GUAIFENESIN 600 MG: 600 TABLET, EXTENDED RELEASE ORAL at 21:21

## 2019-06-28 RX ADMIN — Medication 2 CAPSULE: at 17:32

## 2019-06-28 RX ADMIN — FUROSEMIDE 40 MG: 10 INJECTION, SOLUTION INTRAMUSCULAR; INTRAVENOUS at 18:30

## 2019-06-28 RX ADMIN — Medication 10 ML: at 08:28

## 2019-06-28 RX ADMIN — MAGNESIUM SULFATE HEPTAHYDRATE 4 G: 40 INJECTION, SOLUTION INTRAVENOUS at 10:12

## 2019-06-28 ASSESSMENT — PAIN DESCRIPTION - PROGRESSION
CLINICAL_PROGRESSION: NOT CHANGED
CLINICAL_PROGRESSION: NOT CHANGED

## 2019-06-28 ASSESSMENT — PAIN SCALES - GENERAL
PAINLEVEL_OUTOF10: 0
PAINLEVEL_OUTOF10: 0

## 2019-06-28 ASSESSMENT — PAIN - FUNCTIONAL ASSESSMENT: PAIN_FUNCTIONAL_ASSESSMENT: PREVENTS OR INTERFERES SOME ACTIVE ACTIVITIES AND ADLS

## 2019-06-28 ASSESSMENT — PAIN DESCRIPTION - PAIN TYPE: TYPE: ACUTE PAIN

## 2019-06-28 ASSESSMENT — PAIN DESCRIPTION - FREQUENCY: FREQUENCY: CONTINUOUS

## 2019-06-28 ASSESSMENT — PAIN DESCRIPTION - ONSET: ONSET: ON-GOING

## 2019-06-28 ASSESSMENT — PAIN DESCRIPTION - DESCRIPTORS: DESCRIPTORS: ACHING;CONSTANT

## 2019-06-28 ASSESSMENT — PAIN DESCRIPTION - ORIENTATION: ORIENTATION: LEFT

## 2019-06-28 ASSESSMENT — PAIN DESCRIPTION - LOCATION: LOCATION: GENERALIZED

## 2019-06-28 NOTE — PROGRESS NOTES
Nephrology Progress Note  [phone]   [email or web address]    Patient:  David Mcdonald   : 1940    CC:  NOBLE    Subjective:  More awake , looks comfortable . Labs reviewed . Events noted . ROS:   No CP, improved SOB/GUERRERO. SHx:  Family present     Meds:  Reviewed     Vitals:  /64   Pulse 123   Temp 98.7 °F (37.1 °C) (Oral)   Resp 18   Ht 5' 3\" (1.6 m)   Wt 167 lb 8.8 oz (76 kg)   SpO2 95%   BMI 29.68 kg/m²     Physical Exam:  Gen: Resting in bed, NAD. HEENT: MMM, OP clear. CV: RRR no rub noted   Lungs: Few rhonchi . Rocky Patron   Abd: S/NT +BS  Ext: trace  Edema      Labs:  CBC:   Lab Results   Component Value Date    WBC 15.4 2019    RBC 4.67 2019    RBC 3.81 2017    HGB 13.3 2019    HCT 41.5 2019    MCV 89.0 2019    MCH 28.5 2019    MCHC 32.0 2019    RDW 18.8 2019     2019    MPV 7.0 2019     BMP:    Lab Results   Component Value Date     2019    K 3.5 2019    K 4.9 2019    CL 97 2019    CO2 30 2019    BUN 28 2019    LABALBU 3.0 2019    CREATININE 0.9 2019    CALCIUM 9.3 2019    GFRAA >60 2019    LABGLOM >60 2019    GLUCOSE 96 2019    GLUCOSE 97 2017       Assessment/Plan:  1. NOBLE  Resolved. 2. CHF on lasix. Improved . 3. CKD Etiology HTN  4. HTN controlled. 5. Hypokalemia will supp. 6. Monitor electrolytes ds . Will sign off.        Felix Black MD.FACP

## 2019-06-28 NOTE — CARE COORDINATION
Sw continues to follow. Patient will return home with [facility]. RW and shower chair and/or tub transfer bench recommended. Referral initiated with Chana Allan at [address] [place] [address]. Will need:  DME orders for above DME  Home Care Order   DIANE    Notified MD of above.      Tash Posada, [address] [place] [address]  [phone]  6/28/19 at 3:03 PM

## 2019-06-28 NOTE — PROGRESS NOTES
pulmonary artery dilatation can be seen with pulmonary hypertension. 5. 10 mm hyperdense nodule inferior pole left kidney almost certainly   reflects hemorrhagic cyst.  Follow-up CT abdomen in 6-12 months recommended   to ensure stability. 6. Mild anasarca. 7. Calcific atherosclerotic disease aorta. 8. Small, fat and ascites containing inguinal hernias bilateral.   9. Moderate size hiatal hernia. 10. Advanced degenerative changes lumbar spine at L4-5 with grade 2   anterolisthesis L4 on L5. XR CHEST PORTABLE   Final Result   Cardiomegaly with radiographic worsening of pulmonary edema and persistent   left larger than right pleural effusions         US RENAL COMPLETE   Final Result   Mild diffuse increased echogenicity of the kidneys consistent with medical   renal disease. CT CHEST WO CONTRAST   Final Result   1. Interstitial pulmonary edema, moderate to large bilateral pleural   effusions, and mild cardiomegaly, suggesting congestive heart failure. 2. Passive atelectasis in the bilateral lower lobes. Superimposed pneumonia   or aspiration may be present bilaterally. 3. Findings suggestive of pulmonary hypertension. 4. Perivascular stranding adjacent to the splenic hilum, an indeterminate   finding that could be related to underlying splenic vein thrombosis. Consider further evaluation with sonography or a contrast-enhanced abdomen   and pelvis CT. 5. Trace pericardial effusion and mild anasarca. XR CHEST STANDARD (2 VW)   Final Result   Findings most compatible with worsening or recurrent pulmonary edema. Correlate with any clinical evidence of superimposed infection.                  Assessment/Plan:    Active Hospital Problems    Diagnosis    Mild malnutrition ([address] [E44.1]    Atrial tachycardia ([address] [I47.1]    NOBLE (acute kidney injury) ([address] [N17.9]    PSVT (paroxysmal supraventricular tachycardia) ([address] [I47.1]    Demand ischemia ([address] [I24.8]    Acute on chronic diastolic heart failure (HCC) [I50.33]    Acute respiratory failure with hypoxia ([facility]) [J96.01]    Aortic valve stenosis [I35.0]    NSTEMI (non-ST elevated myocardial infarction) ([facility] [I21.4]    Cellulitis of left upper extremity [L03.114]    Acute cystitis without hematuria [N30.00]    Leukocytosis [D72.829]    CHF (congestive heart failure), NYHA class I, acute on chronic, combined ([facility]) [I50.43]    CKD (chronic kidney disease) stage 2, GFR 60-89 ml/min [N18.2]     Hospital course: an [de-identified] yo female admitted with acute hypoxic respiratory failure, uti, and chf exacerbation. She was treated with iv lasix. On 6/24/19 a RR was called for pt being lethargic, hypotensive with sbp in the low 60s, hypothermic at 95 rectal. bp improved with trendelenburg but still low in the 80s. She was transferred to the icu on bipap. The next day she had improved on 5L O2.      Sinus tachycardia   - ecg  - will give lopressor 5 mg iv now and monitor on tele  - had received toprol 12.5 mg this am already  - cardiology on board    Acute hypoxic respiratory failure - now on 1L O2  - with criteria: < 90% on RA, accessory muscle use, RR> 18, due to pulmonary edema  - supplemental oxygen as necessary to keep SaO2 > 90%, not on O2 at home  - repeat cxr with unchanged effusions and pulm edema  - repeat abg mild improvement  - lasix restarted  - refusing bipap qhs    CHF: acute diastolic/anasarca   -A 2D Echocardiogram on March 26, 2019 ([facility]) shows an EF of 55-60%, X-ray shows pulmonary edema  - BNP 52K -> 4K  - echo in 2017 with lvef 70-75%, mild as, mod pulm htn  -  Monitor strict I&Os: - 2 L x 24 hrs; - 8.4 L total   - daily weights  - lasix 40 mg bid, can change to po today?  - bb started, can start low dose lisinopril  - Cardiology has been consulted  - The CHF nurse has been consulted to discuss disease management       UTI:  - zosyn/rocephin - abx day 7  - ucx with e coli     Cellulitis of left hand:  -Patient has no known

## 2019-06-28 NOTE — CARE COORDINATION
[facility] received referral from  for [address]. Will need MD Orders. Per , patient may also wish to purchase either a TTB or SC. [facility] discussed Self-Pay cost with Patient who declined purchasing either item. [facility] discussed locations where patient could obtain a TTB or SC after discharge if she changes her mind. For Weekend Discharge: The ordered wheeled walker can be dispensed from the -[facility] [address] to the patient. Dispensing instructions are attached to the equipment.    (Discussed with the weekend PT)    Thank you for the referral.  Electronically signed by Chana Leon on 6/28/2019 at 3:05 PM  Cell ph# [phone]

## 2019-06-28 NOTE — PROGRESS NOTES
sputum,wheeze, snoring  CARD: Denies palpitations, chest pain, edema  GI:Denies nausea, vomiting, heartburn, loss of appetite, change in bowels  : Denies frequency, pain, incontinence, polyuria  VASC: Denies claudication, leg cramps, clots  MUSC/SKEL: Denies pain, stiffness, arthritis  PSYCH: Denies anxiety, depression, stress  NEURO: Denies numbness, tingling, weakness,change in mood or memory  HEME: Denies abn bruising, bleeding, anemia  ENDO: Denies intolerance to heat, cold, excessive thirst or hunger, hx thyroid disease    Objective:   /64   Pulse 123   Temp 98.7 °F (37.1 °C) (Oral)   Resp 18   Ht 5' 3\" (1.6 m)   Wt 167 lb 8.8 oz (76 kg)   SpO2 95%   BMI 29.68 kg/m²        Intake/Output Summary (Last 24 hours) at 6/28/2019 1327  Last data filed at 6/28/2019 1257  Gross per 24 hour   Intake 300 ml   Output 2700 ml   Net -2400 ml     I/O since adm:  -8,406    WEIGHT:Admit Weight: 200 lb 13.4 oz (91.1 kg) ED  Admit weight to floor 178#           Today  Weight: 167 lb 8.8 oz (76 kg)   DRY WEIGHT:  Wt Readings from Last 3 Encounters:   06/28/19 167 lb 8.8 oz (76 kg)   07/25/17 176 lb (79.8 kg)   07/17/17 175 lb (79.4 kg)       Physical Exam:  GEN: Appears fatigued, no acute distress  SKIN: Pink, warm, dry. Nails without clubbing. HEENT: PERRLA. Normocephalic, atraumatic. Neck supple. No adenopathy. LUNG: AP diameter normal. Crackles bilateral bases. No wheeze or ronchi. Respiratory effort slight SOB at rest, patient is restless. HEART: S1S2 A/R. No JVD. No carotid bruit. No murmur, rub or gallop. ABD: Soft, nontender. +BS X 4 quads. No hepatomegaly. EXT: Radial and pedal pulses 2+ and symmetric. Without varicosities. 1+BLE edema. MUSCSKEL: Good ROM X4 extremities. No deformity. NEURO: A/O X3. Calm and cooperative.      Telemetry: A flutter -140s    Medications:    magnesium sulfate  4 g Intravenous Once    lisinopril  5 mg Oral Daily    phosphorus  250 mg Oral BID    potassium Mitral valve: The annulus was mildly calcified. Mild thickening and calcification of the posterior leaflet. - Left atrium: The atrium was moderately dilated. - Right ventricle: The cavity size was mildly dilated. Wall thickness was normal.  - Right atrium: The atrium was markedly dilated. - Inferior vena cava: The vessel was dilated; the respirophasic diameter changes were in the normal range (>= 50%); findings are consistent with mildly elevated central venous pressure. - Pulmonary arteries: PA peak pressure: 56mm Hg (S). 12/1/2017  Summary   Left ventricle size is normal.   Normal left ventricular wall thickness.   Global left ventricular function is increased (hyperdynamic) with ejection   fraction estimated from 70 % to 75 %.   Diastolic filling parameters suggests grade I diastolic dysfunction .   Mitral annular calcification is present.   The mitral valve apparatus is calcified and no hemodynamically significant   stenosis.   Mild mitral regurgitation is present.   Severely dilated left atrium.   The aortic valve appears calcified, consistent with aortic stenosis.   Mild aortic regurgitation is present.  Tennille Snooks is mild aortic stenosis, JOI by continuity 1.56 cm sq., Peak gradient   27 mmHg, Mean gradient 16 mmHg.   Tricuspid annular calcification.   There is moderate tricuspid regurgitation with RVSP estimated at 55 mmHg,   c/w mild to moderate pulmonary HTN.  Right atrial size is moderately to severely dilated .   The main pulmonary artery and bilateral branches are dilated.   Inferior vena cava appears normal.   IVC respiratory change in dimension > 50%.      3/26/2015  Summary:   1. Left ventricle:  The cavity size is normal. Wall thickness is normal.      Systolic function is normal. The estimated ejection fraction is 55-60%.      Wall motion is normal; there are no regional wall motion abnormalities.      Doppler parameters are consistent with abnormal left ventricular      relaxation (grade 1

## 2019-06-29 LAB
ANION GAP SERPL CALCULATED.3IONS-SCNC: 11 MMOL/L (ref 3–16)
ANISOCYTOSIS: ABNORMAL
BANDED NEUTROPHILS RELATIVE PERCENT: 1 % (ref 0–7)
BASOPHILS ABSOLUTE: 0 K/UL (ref 0–0.2)
BASOPHILS RELATIVE PERCENT: 0 %
BLOOD CULTURE, ROUTINE: NORMAL
BUN BLDV-MCNC: 30 MG/DL (ref 7–20)
CALCIUM SERPL-MCNC: 9.1 MG/DL (ref 8.3–10.6)
CHLORIDE BLD-SCNC: 92 MMOL/L (ref 99–110)
CO2: 31 MMOL/L (ref 21–32)
CREAT SERPL-MCNC: 1 MG/DL (ref 0.6–1.2)
CULTURE, BLOOD 2: NORMAL
EOSINOPHILS ABSOLUTE: 0.3 K/UL (ref 0–0.6)
EOSINOPHILS RELATIVE PERCENT: 2 %
GFR AFRICAN AMERICAN: >60
GFR NON-AFRICAN AMERICAN: 53
GLUCOSE BLD-MCNC: 98 MG/DL (ref 70–99)
HCT VFR BLD CALC: 40.2 % (ref 36–48)
HEMOGLOBIN: 12.9 G/DL (ref 12–16)
LYMPHOCYTES ABSOLUTE: 0.2 K/UL (ref 1–5.1)
LYMPHOCYTES RELATIVE PERCENT: 1 %
MAGNESIUM: 2.3 MG/DL (ref 1.8–2.4)
MCH RBC QN AUTO: 28.4 PG (ref 26–34)
MCHC RBC AUTO-ENTMCNC: 32 G/DL (ref 31–36)
MCV RBC AUTO: 88.9 FL (ref 80–100)
METAMYELOCYTES RELATIVE PERCENT: 1 %
MONOCYTES ABSOLUTE: 1 K/UL (ref 0–1.3)
MONOCYTES RELATIVE PERCENT: 6 %
NEUTROPHILS ABSOLUTE: 15.1 K/UL (ref 1.7–7.7)
NEUTROPHILS RELATIVE PERCENT: 89 %
PDW BLD-RTO: 19 % (ref 12.4–15.4)
PHOSPHORUS: 3.5 MG/DL (ref 2.5–4.9)
PLATELET # BLD: 489 K/UL (ref 135–450)
PLATELET SLIDE REVIEW: ABNORMAL
PMV BLD AUTO: 7.3 FL (ref 5–10.5)
POTASSIUM SERPL-SCNC: 3.8 MMOL/L (ref 3.5–5.1)
RBC # BLD: 4.53 M/UL (ref 4–5.2)
REASON FOR REJECTION: NORMAL
REJECTED TEST: NORMAL
SLIDE REVIEW: ABNORMAL
SODIUM BLD-SCNC: 134 MMOL/L (ref 136–145)
WBC # BLD: 16.6 K/UL (ref 4–11)

## 2019-06-29 PROCEDURE — 6370000000 HC RX 637 (ALT 250 FOR IP): Performed by: NURSE PRACTITIONER

## 2019-06-29 PROCEDURE — 2580000003 HC RX 258: Performed by: NURSE PRACTITIONER

## 2019-06-29 PROCEDURE — 2700000000 HC OXYGEN THERAPY PER DAY

## 2019-06-29 PROCEDURE — 6360000002 HC RX W HCPCS: Performed by: NURSE PRACTITIONER

## 2019-06-29 PROCEDURE — 94761 N-INVAS EAR/PLS OXIMETRY MLT: CPT

## 2019-06-29 PROCEDURE — 6360000002 HC RX W HCPCS: Performed by: INTERNAL MEDICINE

## 2019-06-29 PROCEDURE — 6370000000 HC RX 637 (ALT 250 FOR IP): Performed by: FAMILY MEDICINE

## 2019-06-29 PROCEDURE — 85025 COMPLETE CBC W/AUTO DIFF WBC: CPT

## 2019-06-29 PROCEDURE — 84100 ASSAY OF PHOSPHORUS: CPT

## 2019-06-29 PROCEDURE — 99232 SBSQ HOSP IP/OBS MODERATE 35: CPT | Performed by: INTERNAL MEDICINE

## 2019-06-29 PROCEDURE — 80048 BASIC METABOLIC PNL TOTAL CA: CPT

## 2019-06-29 PROCEDURE — 6370000000 HC RX 637 (ALT 250 FOR IP): Performed by: INTERNAL MEDICINE

## 2019-06-29 PROCEDURE — 99232 SBSQ HOSP IP/OBS MODERATE 35: CPT | Performed by: NURSE PRACTITIONER

## 2019-06-29 PROCEDURE — 94669 MECHANICAL CHEST WALL OSCILL: CPT

## 2019-06-29 PROCEDURE — 2060000000 HC ICU INTERMEDIATE R&B

## 2019-06-29 PROCEDURE — 2580000003 HC RX 258: Performed by: INTERNAL MEDICINE

## 2019-06-29 PROCEDURE — 83735 ASSAY OF MAGNESIUM: CPT

## 2019-06-29 RX ADMIN — HEPARIN SODIUM 5000 UNITS: 5000 INJECTION INTRAVENOUS; SUBCUTANEOUS at 21:56

## 2019-06-29 RX ADMIN — AMITRIPTYLINE HYDROCHLORIDE 10 MG: 10 TABLET, FILM COATED ORAL at 21:50

## 2019-06-29 RX ADMIN — Medication 2 CAPSULE: at 09:05

## 2019-06-29 RX ADMIN — HEPARIN SODIUM 5000 UNITS: 5000 INJECTION INTRAVENOUS; SUBCUTANEOUS at 06:21

## 2019-06-29 RX ADMIN — PREDNISOLONE ACETATE 1 DROP: 1.2 SUSPENSION/ DROPS OPHTHALMIC at 14:33

## 2019-06-29 RX ADMIN — GUAIFENESIN 600 MG: 600 TABLET, EXTENDED RELEASE ORAL at 21:50

## 2019-06-29 RX ADMIN — POLYETHYLENE GLYCOL, PROPYLENE GLYCOL 2 DROP: .4; .3 LIQUID OPHTHALMIC at 09:06

## 2019-06-29 RX ADMIN — DIGOXIN 125 MCG: 125 TABLET ORAL at 09:06

## 2019-06-29 RX ADMIN — Medication 10 ML: at 09:05

## 2019-06-29 RX ADMIN — GUAIFENESIN 600 MG: 600 TABLET, EXTENDED RELEASE ORAL at 09:05

## 2019-06-29 RX ADMIN — FLUOXETINE 20 MG: 20 CAPSULE ORAL at 09:05

## 2019-06-29 RX ADMIN — Medication 10 ML: at 09:07

## 2019-06-29 RX ADMIN — DIBASIC SODIUM PHOSPHATE, MONOBASIC POTASSIUM PHOSPHATE AND MONOBASIC SODIUM PHOSPHATE 1 TABLET: 852; 155; 130 TABLET ORAL at 09:05

## 2019-06-29 RX ADMIN — PREDNISOLONE ACETATE 1 DROP: 1.2 SUSPENSION/ DROPS OPHTHALMIC at 21:53

## 2019-06-29 RX ADMIN — Medication 10 ML: at 21:52

## 2019-06-29 RX ADMIN — FUROSEMIDE 40 MG: 10 INJECTION, SOLUTION INTRAMUSCULAR; INTRAVENOUS at 17:35

## 2019-06-29 RX ADMIN — FUROSEMIDE 40 MG: 10 INJECTION, SOLUTION INTRAMUSCULAR; INTRAVENOUS at 09:05

## 2019-06-29 RX ADMIN — PREDNISONE 5 MG: 5 TABLET ORAL at 09:05

## 2019-06-29 RX ADMIN — Medication 2 CAPSULE: at 17:34

## 2019-06-29 RX ADMIN — LEVOTHYROXINE SODIUM 137 MCG: 25 TABLET ORAL at 06:21

## 2019-06-29 RX ADMIN — FOLIC ACID 1 MG: 1 TABLET ORAL at 09:05

## 2019-06-29 RX ADMIN — HEPARIN SODIUM 5000 UNITS: 5000 INJECTION INTRAVENOUS; SUBCUTANEOUS at 14:33

## 2019-06-29 RX ADMIN — SPIRONOLACTONE 25 MG: 25 TABLET ORAL at 09:05

## 2019-06-29 RX ADMIN — PREDNISOLONE ACETATE 1 DROP: 1.2 SUSPENSION/ DROPS OPHTHALMIC at 09:06

## 2019-06-29 RX ADMIN — BUPROPION HYDROCHLORIDE 150 MG: 150 TABLET, FILM COATED, EXTENDED RELEASE ORAL at 09:05

## 2019-06-29 RX ADMIN — CEFTRIAXONE 2 G: 2 INJECTION, POWDER, FOR SOLUTION INTRAMUSCULAR; INTRAVENOUS at 14:33

## 2019-06-29 RX ADMIN — Medication 10 ML: at 02:17

## 2019-06-29 RX ADMIN — ROPINIROLE HYDROCHLORIDE 0.25 MG: 0.5 TABLET, FILM COATED ORAL at 21:51

## 2019-06-29 RX ADMIN — DIBASIC SODIUM PHOSPHATE, MONOBASIC POTASSIUM PHOSPHATE AND MONOBASIC SODIUM PHOSPHATE 1 TABLET: 852; 155; 130 TABLET ORAL at 21:51

## 2019-06-29 ASSESSMENT — PAIN DESCRIPTION - PROGRESSION
CLINICAL_PROGRESSION: NOT CHANGED

## 2019-06-29 ASSESSMENT — PAIN SCALES - GENERAL
PAINLEVEL_OUTOF10: 0

## 2019-06-29 NOTE — PROGRESS NOTES
pulmonary artery dilatation can be seen with pulmonary hypertension. 5. 10 mm hyperdense nodule inferior pole left kidney almost certainly   reflects hemorrhagic cyst.  Follow-up CT abdomen in 6-12 months recommended   to ensure stability. 6. Mild anasarca. 7. Calcific atherosclerotic disease aorta. 8. Small, fat and ascites containing inguinal hernias bilateral.   9. Moderate size hiatal hernia. 10. Advanced degenerative changes lumbar spine at L4-5 with grade 2   anterolisthesis L4 on L5. XR CHEST PORTABLE   Final Result   Cardiomegaly with radiographic worsening of pulmonary edema and persistent   left larger than right pleural effusions         US RENAL COMPLETE   Final Result   Mild diffuse increased echogenicity of the kidneys consistent with medical   renal disease. CT CHEST WO CONTRAST   Final Result   1. Interstitial pulmonary edema, moderate to large bilateral pleural   effusions, and mild cardiomegaly, suggesting congestive heart failure. 2. Passive atelectasis in the bilateral lower lobes. Superimposed pneumonia   or aspiration may be present bilaterally. 3. Findings suggestive of pulmonary hypertension. 4. Perivascular stranding adjacent to the splenic hilum, an indeterminate   finding that could be related to underlying splenic vein thrombosis. Consider further evaluation with sonography or a contrast-enhanced abdomen   and pelvis CT. 5. Trace pericardial effusion and mild anasarca. XR CHEST STANDARD (2 VW)   Final Result   Findings most compatible with worsening or recurrent pulmonary edema. Correlate with any clinical evidence of superimposed infection.                  Assessment/Plan:    Active Hospital Problems    Diagnosis    Paroxysmal atrial fibrillation (HCC) [I48.0]    Pulmonary edema cardiac cause (HCC) [I50.1]    Atrial flutter (HCC) [I48.92]    Mild malnutrition (HCC) [E44.1]    Atrial tachycardia ([address] [I47.1]    NOBLE (acute kidney

## 2019-06-29 NOTE — PROGRESS NOTES
valve: Moderate focal thickening and calcification. Cusp    separation was moderately reduced. There was moderate stenosis.    There was trivial regurgitation. Mean gradient (S): 14mm Hg. Peak    gradient (S): 26mm Hg. Valve area (VTI): 1.2cm^2. Indexed valve    area (VTI): 0.66cm^2/m^2. Valve area (Vmax): 1.25cm^2. Indexed    valve area (Vmax): 0.69cm^2/m^2. - Aorta: Aortic root dimension: 3.9cm (ED). Ascending aortic    diameter: 4.4cm (S).  - Mitral valve: The annulus was mildly calcified. Mild thickening    and calcification of the posterior leaflet. - Left atrium: The atrium was moderately dilated. - Right ventricle: The cavity size was mildly dilated. Wall    thickness was normal.  - Right atrium: The atrium was markedly dilated. - Inferior vena cava: The vessel was dilated; the respirophasic    diameter changes were in the normal range (>= 50%); findings are    consistent with mildly elevated central venous pressure. - Pulmonary arteries: PA peak pressure: 56mm Hg (S). Assessment:   Acute hypoxic respiratory failure  Acute CHF  Pulmonary edema  Atrial fibrillation  Acute cystitis, E. Coli    Plan:  · Maintain oxygen saturations >90% with supplemental oxygen and wean as tolerated  · Lasix per cardiology  · Prednisone daily  · Rocephin for 5 days for UTI  · Controlled rate Afib, followed by cardiology  · DVT prophylaxis with heparin    Thank you for allowing me to participate in the care of this patient.     Giancarlo Monteiro, [facility]

## 2019-06-29 NOTE — PROGRESS NOTES
The annulus was mildly calcified. Mild thickening and calcification of the posterior leaflet. - Left atrium: The atrium was moderately dilated. - Right ventricle: The cavity size was mildly dilated. Wall thickness was normal.  - Right atrium: The atrium was markedly dilated. - Inferior vena cava: The vessel was dilated; the respirophasic diameter changes were in the normal range (>= 50%); findings are consistent with mildly elevated central venous pressure. - Pulmonary arteries: PA peak pressure: 56mm Hg (S). 12/1/2017  Summary   Left ventricle size is normal.   Normal left ventricular wall thickness.   Global left ventricular function is increased (hyperdynamic) with ejection   fraction estimated from 70 % to 75 %.   Diastolic filling parameters suggests grade I diastolic dysfunction .   Mitral annular calcification is present.   The mitral valve apparatus is calcified and no hemodynamically significant   stenosis.   Mild mitral regurgitation is present.   Severely dilated left atrium.   The aortic valve appears calcified, consistent with aortic stenosis.   Mild aortic regurgitation is present.  Ana Luisa [place] is mild aortic stenosis, JOI by continuity 1.56 cm sq., Peak gradient   27 mmHg, Mean gradient 16 mmHg.   Tricuspid annular calcification.   There is moderate tricuspid regurgitation with RVSP estimated at 55 mmHg,   c/w mild to moderate pulmonary HTN.  Right atrial size is moderately to severely dilated .   The main pulmonary artery and bilateral branches are dilated.   Inferior vena cava appears normal.   IVC respiratory change in dimension > 50%.      3/26/2015  Summary:   1. Left ventricle:  The cavity size is normal. Wall thickness is normal.      Systolic function is normal. The estimated ejection fraction is 55-60%.      Wall motion is normal; there are no regional wall motion abnormalities.      Doppler parameters are consistent with abnormal left ventricular      relaxation (grade 1 diastolic dysfunction). 2. Aortic valve: Thickening, consistent with sclerosis. Mild regurgitation. 3. Mitral valve: Mild regurgitation. 4. Left atrium: The atrium is markedly dilated. 5. Right ventricle: Estimation of the right ventricular systolic pressure is      mildly increased. RV systolic pressure: 47UA Hg (S, est). 6. Right atrium: The atrium is moderately dilated. 7. Tricuspid valve: Moderate regurgitation. 8. Pericardium, extracardiac: There is no significant pericardial effusion. Prepared and signed by     Stress Test: none    Cardiac Angiography: None    CXR 6/24/19  FINDINGS:   Cardiac silhouette is enlarged.  Right PICC with tip projecting in the region   of the lower SVC.  No pneumothorax.  Small bilateral pleural effusions and   interstitial edema, similar to previous exam.           Impression   Right PICC with tip projecting in the region lower SVC, new. ABD CT 6/22/19  Impression   1. Nonspecific bilateral pleural effusion with bibasilar consolidation   unchanged from prior study. 2. Abdominal and pelvic ascites and anasarca presumably related to fluid   volume overload. 3. No urinary collecting system calculus evident. 4. Main pulmonary artery dilatation can be seen with pulmonary hypertension. 5. 10 mm hyperdense nodule inferior pole left kidney almost certainly   reflects hemorrhagic cyst.  Follow-up CT abdomen in 6-12 months recommended   to ensure stability. 6. Mild anasarca. 7. Calcific atherosclerotic disease aorta. 8. Small, fat and ascites containing inguinal hernias bilateral.   9. Moderate size hiatal hernia. 10. Advanced degenerative changes lumbar spine at L4-5 with grade 2   anterolisthesis L4 on L5.     RENAL US 6/21/19  Impression   Mild diffuse increased echogenicity of the kidneys consistent with medical   renal disease. CHEST CT 6/21/19  Impression   1.  Interstitial pulmonary edema, moderate to large bilateral pleural   effusions, and mild

## 2019-06-30 LAB
ANION GAP SERPL CALCULATED.3IONS-SCNC: 14 MMOL/L (ref 3–16)
BASOPHILS ABSOLUTE: 0 K/UL (ref 0–0.2)
BASOPHILS RELATIVE PERCENT: 0 %
BUN BLDV-MCNC: 29 MG/DL (ref 7–20)
CALCIUM SERPL-MCNC: 8.8 MG/DL (ref 8.3–10.6)
CHLORIDE BLD-SCNC: 96 MMOL/L (ref 99–110)
CO2: 30 MMOL/L (ref 21–32)
CREAT SERPL-MCNC: 0.9 MG/DL (ref 0.6–1.2)
EOSINOPHILS ABSOLUTE: 0.2 K/UL (ref 0–0.6)
EOSINOPHILS RELATIVE PERCENT: 2 %
GFR AFRICAN AMERICAN: >60
GFR NON-AFRICAN AMERICAN: >60
GLUCOSE BLD-MCNC: 90 MG/DL (ref 70–99)
HCT VFR BLD CALC: 38 % (ref 36–48)
HEMOGLOBIN: 12.3 G/DL (ref 12–16)
LYMPHOCYTES ABSOLUTE: 0.7 K/UL (ref 1–5.1)
LYMPHOCYTES RELATIVE PERCENT: 6 %
MAGNESIUM: 1.9 MG/DL (ref 1.8–2.4)
MCH RBC QN AUTO: 28.8 PG (ref 26–34)
MCHC RBC AUTO-ENTMCNC: 32.3 G/DL (ref 31–36)
MCV RBC AUTO: 89.1 FL (ref 80–100)
METAMYELOCYTES RELATIVE PERCENT: 1 %
MONOCYTES ABSOLUTE: 1 K/UL (ref 0–1.3)
MONOCYTES RELATIVE PERCENT: 9 %
NEUTROPHILS ABSOLUTE: 9.5 K/UL (ref 1.7–7.7)
NEUTROPHILS RELATIVE PERCENT: 82 %
PDW BLD-RTO: 19.1 % (ref 12.4–15.4)
PHOSPHORUS: 4 MG/DL (ref 2.5–4.9)
PLATELET # BLD: 427 K/UL (ref 135–450)
PMV BLD AUTO: 7.3 FL (ref 5–10.5)
POTASSIUM SERPL-SCNC: 3.9 MMOL/L (ref 3.5–5.1)
RBC # BLD: 4.27 M/UL (ref 4–5.2)
SODIUM BLD-SCNC: 140 MMOL/L (ref 136–145)
WBC # BLD: 11.5 K/UL (ref 4–11)

## 2019-06-30 PROCEDURE — 6370000000 HC RX 637 (ALT 250 FOR IP): Performed by: INTERNAL MEDICINE

## 2019-06-30 PROCEDURE — 2700000000 HC OXYGEN THERAPY PER DAY

## 2019-06-30 PROCEDURE — 2580000003 HC RX 258: Performed by: INTERNAL MEDICINE

## 2019-06-30 PROCEDURE — 83735 ASSAY OF MAGNESIUM: CPT

## 2019-06-30 PROCEDURE — 6370000000 HC RX 637 (ALT 250 FOR IP): Performed by: NURSE PRACTITIONER

## 2019-06-30 PROCEDURE — 94669 MECHANICAL CHEST WALL OSCILL: CPT

## 2019-06-30 PROCEDURE — 80048 BASIC METABOLIC PNL TOTAL CA: CPT

## 2019-06-30 PROCEDURE — 2580000003 HC RX 258: Performed by: NURSE PRACTITIONER

## 2019-06-30 PROCEDURE — 6360000002 HC RX W HCPCS: Performed by: INTERNAL MEDICINE

## 2019-06-30 PROCEDURE — 94761 N-INVAS EAR/PLS OXIMETRY MLT: CPT

## 2019-06-30 PROCEDURE — 6370000000 HC RX 637 (ALT 250 FOR IP): Performed by: FAMILY MEDICINE

## 2019-06-30 PROCEDURE — 99232 SBSQ HOSP IP/OBS MODERATE 35: CPT | Performed by: NURSE PRACTITIONER

## 2019-06-30 PROCEDURE — 99232 SBSQ HOSP IP/OBS MODERATE 35: CPT | Performed by: INTERNAL MEDICINE

## 2019-06-30 PROCEDURE — 2060000000 HC ICU INTERMEDIATE R&B

## 2019-06-30 PROCEDURE — 6360000002 HC RX W HCPCS: Performed by: NURSE PRACTITIONER

## 2019-06-30 PROCEDURE — 84100 ASSAY OF PHOSPHORUS: CPT

## 2019-06-30 PROCEDURE — 85025 COMPLETE CBC W/AUTO DIFF WBC: CPT

## 2019-06-30 RX ORDER — FUROSEMIDE 40 MG/1
40 TABLET ORAL DAILY
Status: DISCONTINUED | OUTPATIENT
Start: 2019-06-30 | End: 2019-07-02 | Stop reason: HOSPADM

## 2019-06-30 RX ADMIN — ROPINIROLE HYDROCHLORIDE 0.25 MG: 0.5 TABLET, FILM COATED ORAL at 21:14

## 2019-06-30 RX ADMIN — DIBASIC SODIUM PHOSPHATE, MONOBASIC POTASSIUM PHOSPHATE AND MONOBASIC SODIUM PHOSPHATE 1 TABLET: 852; 155; 130 TABLET ORAL at 10:02

## 2019-06-30 RX ADMIN — SPIRONOLACTONE 25 MG: 25 TABLET ORAL at 10:02

## 2019-06-30 RX ADMIN — DIBASIC SODIUM PHOSPHATE, MONOBASIC POTASSIUM PHOSPHATE AND MONOBASIC SODIUM PHOSPHATE 1 TABLET: 852; 155; 130 TABLET ORAL at 21:14

## 2019-06-30 RX ADMIN — Medication 2 CAPSULE: at 10:02

## 2019-06-30 RX ADMIN — HEPARIN SODIUM 5000 UNITS: 5000 INJECTION INTRAVENOUS; SUBCUTANEOUS at 21:14

## 2019-06-30 RX ADMIN — FUROSEMIDE 40 MG: 40 TABLET ORAL at 14:10

## 2019-06-30 RX ADMIN — FUROSEMIDE 40 MG: 10 INJECTION, SOLUTION INTRAMUSCULAR; INTRAVENOUS at 10:02

## 2019-06-30 RX ADMIN — AMITRIPTYLINE HYDROCHLORIDE 10 MG: 10 TABLET, FILM COATED ORAL at 21:14

## 2019-06-30 RX ADMIN — Medication 10 ML: at 10:03

## 2019-06-30 RX ADMIN — CEFTRIAXONE 2 G: 2 INJECTION, POWDER, FOR SOLUTION INTRAMUSCULAR; INTRAVENOUS at 14:10

## 2019-06-30 RX ADMIN — FOLIC ACID 1 MG: 1 TABLET ORAL at 10:02

## 2019-06-30 RX ADMIN — Medication 2 CAPSULE: at 17:59

## 2019-06-30 RX ADMIN — PREDNISOLONE ACETATE 1 DROP: 1.2 SUSPENSION/ DROPS OPHTHALMIC at 10:03

## 2019-06-30 RX ADMIN — GUAIFENESIN 600 MG: 600 TABLET, EXTENDED RELEASE ORAL at 21:14

## 2019-06-30 RX ADMIN — DIGOXIN 125 MCG: 125 TABLET ORAL at 10:02

## 2019-06-30 RX ADMIN — FLUOXETINE 20 MG: 20 CAPSULE ORAL at 10:02

## 2019-06-30 RX ADMIN — Medication 10 ML: at 21:51

## 2019-06-30 RX ADMIN — HEPARIN SODIUM 5000 UNITS: 5000 INJECTION INTRAVENOUS; SUBCUTANEOUS at 05:37

## 2019-06-30 RX ADMIN — PREDNISOLONE ACETATE 1 DROP: 1.2 SUSPENSION/ DROPS OPHTHALMIC at 21:51

## 2019-06-30 RX ADMIN — PREDNISONE 5 MG: 5 TABLET ORAL at 10:02

## 2019-06-30 RX ADMIN — LEVOTHYROXINE SODIUM 137 MCG: 25 TABLET ORAL at 05:37

## 2019-06-30 RX ADMIN — HEPARIN SODIUM 5000 UNITS: 5000 INJECTION INTRAVENOUS; SUBCUTANEOUS at 14:10

## 2019-06-30 RX ADMIN — PREDNISOLONE ACETATE 1 DROP: 1.2 SUSPENSION/ DROPS OPHTHALMIC at 14:11

## 2019-06-30 RX ADMIN — GUAIFENESIN 600 MG: 600 TABLET, EXTENDED RELEASE ORAL at 10:02

## 2019-06-30 RX ADMIN — BUPROPION HYDROCHLORIDE 150 MG: 150 TABLET, FILM COATED, EXTENDED RELEASE ORAL at 10:02

## 2019-06-30 ASSESSMENT — PAIN SCALES - GENERAL
PAINLEVEL_OUTOF10: 0

## 2019-06-30 NOTE — PROGRESS NOTES
abnormal left ventricular      relaxation (grade 1 diastolic dysfunction). 2. Aortic valve: Thickening, consistent with sclerosis. Mild regurgitation. 3. Mitral valve: Mild regurgitation. 4. Left atrium: The atrium is markedly dilated. 5. Right ventricle: Estimation of the right ventricular systolic pressure is      mildly increased. RV systolic pressure: 24IL Hg (S, est). 6. Right atrium: The atrium is moderately dilated. 7. Tricuspid valve: Moderate regurgitation. 8. Pericardium, extracardiac: There is no significant pericardial effusion. Prepared and signed by     Stress Test: none    Cardiac Angiography: None    CXR 6/24/19  FINDINGS:   Cardiac silhouette is enlarged.  Right PICC with tip projecting in the region   of the lower SVC.  No pneumothorax.  Small bilateral pleural effusions and   interstitial edema, similar to previous exam.           Impression   Right PICC with tip projecting in the region lower SVC, new. ABD CT 6/22/19  Impression   1. Nonspecific bilateral pleural effusion with bibasilar consolidation   unchanged from prior study. 2. Abdominal and pelvic ascites and anasarca presumably related to fluid   volume overload. 3. No urinary collecting system calculus evident. 4. Main pulmonary artery dilatation can be seen with pulmonary hypertension. 5. 10 mm hyperdense nodule inferior pole left kidney almost certainly   reflects hemorrhagic cyst.  Follow-up CT abdomen in 6-12 months recommended   to ensure stability. 6. Mild anasarca. 7. Calcific atherosclerotic disease aorta. 8. Small, fat and ascites containing inguinal hernias bilateral.   9. Moderate size hiatal hernia. 10. Advanced degenerative changes lumbar spine at L4-5 with grade 2   anterolisthesis L4 on L5.     RENAL US 6/21/19  Impression   Mild diffuse increased echogenicity of the kidneys consistent with medical   renal disease. CHEST CT 6/21/19  Impression   1.  Interstitial pulmonary edema, Stable    5.) Demand Ischemia/ NSTEMI: History is not consistent with ACS. Elevated troponin may be related to CKD, infection, and tachyarrhythmia. Cannot exclude underlying CAD but no plans for stress testing or angiography.      6.) NOBLE on CKD: Resolved. Followed by Neph. Creatinine improved and tolerating diuresis. 7.) UTI/ possible sepsis: + E Coli in urine. Tx per Primary team with antibiotics.       Electronically signed by Reid Gomez MD on 6/30/2019 at 1:09 PM

## 2019-06-30 NOTE — PROGRESS NOTES
meal provided prednisoLONE acetate  1 drop Both Eyes TID    predniSONE  5 mg Oral Daily    rOPINIRole  0.25 mg Oral Nightly       Continuous Infusions:      PRN Meds:  sodium chloride flush, HYDROcodone-acetaminophen, sodium chloride flush, ondansetron, acetaminophen, polyethyl glycol-propyl glycol 0.4-0.3 %    Labs:  CBC:   Recent Labs     19  0517 19  0755 19  0552   WBC 15.4* 16.6* 11.5*   HGB 13.3 12.9 12.3   HCT 41.5 40.2 38.0   MCV 89.0 88.9 89.1   * 489* 427     BMP:   Recent Labs     19  0517 19  0755 19  0552    134* 140   K 3.5 3.8 3.9   CL 97* 92* 96*   CO2 30 31 30   PHOS 2.2* 3.5 4.0   BUN 28* 30* 29*   CREATININE 0.9 1.0 0.9     LIVER PROFILE: No results for input(s): AST, ALT, LIPASE, BILIDIR, BILITOT, ALKPHOS in the last 72 hours. Invalid input(s): AMYLASE,  ALB  PT/INR: No results for input(s): PROTIME, INR in the last 72 hours. APTT: No results for input(s): APTT in the last 72 hours. UA:No results for input(s): NITRITE, COLORU, PHUR, LABCAST, WBCUA, RBCUA, MUCUS, TRICHOMONAS, YEAST, BACTERIA, CLARITYU, SPECGRAV, LEUKOCYTESUR, UROBILINOGEN, BILIRUBINUR, BLOODU, GLUCOSEU, AMORPHOUS in the last 72 hours. Invalid input(s): Lexy Spurr  No results for input(s): PH, PCO2, PO2 in the last 72 hours. Films:  Chest imaging and associated reports were personally reviewed and showed 6:24: small bilateral pleural effusions, pulmonary edema, PICC in place    AB    Cultures:  Blood: negative  Urine: e.coli  MRSA probe: negative    ECHO  3.26.19  - Left ventricle: The cavity size was normal. Wall thickness was    normal. Systolic function was normal. The calculated ejection    fraction was in the range of 55% to 60%. Wall motion was normal;    there were no regional wall motion abnormalities. Doppler    parameters are consistent with abnormal left ventricular    relaxation (grade 1 diastolic dysfunction).  Stroke volume: 66ml.    Stroke volume/bsa: ambulated to bathroom assisted to bathroom

## 2019-06-30 NOTE — PROGRESS NOTES
cyanosis. No le edema  Skin: Skin color, texture, turgor normal.   Neurologic:  Neurovascularly intact without any focal sensory/motor deficits. Cranial nerves: II-XII intact, grossly non-focal.   Psychiatric:  alert, oriented x3  Capillary Refill: Brisk,< 3 seconds   Peripheral Pulses: +2 palpable, equal bilaterally    Labs:   Recent Labs     06/28/19  0517 06/29/19  0755 06/30/19  0552   WBC 15.4* 16.6* 11.5*   HGB 13.3 12.9 12.3   HCT 41.5 40.2 38.0   * 489* 427     Recent Labs     06/28/19  0517 06/29/19  0755 06/30/19  0552    134* 140   K 3.5 3.8 3.9   CL 97* 92* 96*   CO2 30 31 30   BUN 28* 30* 29*   CREATININE 0.9 1.0 0.9   CALCIUM 9.3 9.1 8.8   PHOS 2.2* 3.5 4.0     No results for input(s): AST, ALT, BILIDIR, BILITOT, ALKPHOS in the last 72 hours. No results for input(s): INR in the last 72 hours. No results for input(s): Lynnell Zenaida in the last 72 hours. Urinalysis:      Lab Results   Component Value Date    NITRU Negative 06/20/2019    WBCUA 33 06/20/2019    RBCUA 0-2 06/20/2019    BLOODU Negative 06/20/2019    SPECGRAV 1.023 06/20/2019    GLUCOSEU Negative 06/20/2019       Radiology:  XR CHEST PORTABLE   Final Result   Right PICC with tip projecting in the region lower SVC, new. XR CHEST PORTABLE   Final Result   No significant change over the past 48 hours. Bilateral advanced pulmonary   edema, and left pleural effusion with adjacent compressive atelectasis in the   left lung base. CT ABDOMEN PELVIS WO CONTRAST Additional Contrast? None   Final Result   1. Nonspecific bilateral pleural effusion with bibasilar consolidation   unchanged from prior study. 2. Abdominal and pelvic ascites and anasarca presumably related to fluid   volume overload. 3. No urinary collecting system calculus evident. 4. Main pulmonary artery dilatation can be seen with pulmonary hypertension.    5. 10 mm hyperdense nodule inferior pole left kidney almost certainly   reflects failure (HCC) [I50.33]    Acute respiratory failure with hypoxia ([facility] [address] [J96.01]    Aortic valve stenosis [I35.0]    NSTEMI (non-ST elevated myocardial infarction) ([facility] [address] [I21.4]    Cellulitis of left upper extremity [L03.114]    Acute cystitis without hematuria [N30.00]    Leukocytosis [D72.829]    CHF (congestive heart failure), NYHA class I, acute on chronic, combined ([facility]) [I50.43]    CKD (chronic kidney disease) stage 2, GFR 60-89 ml/min [N18.2]     Hospital course: an [de-identified] yo female admitted with acute hypoxic respiratory failure, uti, and chf exacerbation. She was treated with iv lasix. On 6/24/19 a RR was called for pt being lethargic, hypotensive with sbp in the low 60s, hypothermic at 95 rectal. bp improved with trendelenburg but still low in the 80s. She was transferred to the icu on bipap. The next day she had improved on 5L O2.      Atrial flutter - improved  - started digoxin  - cont toprol    Acute hypoxic respiratory failure - now on 1L O2  - with criteria: < 90% on RA, accessory muscle use, RR> 18, due to pulmonary edema  - supplemental oxygen as necessary to keep SaO2 > 90%, not on O2 at home  - repeat cxr with unchanged effusions and pulm edema  - repeat abg mild improvement  - lasix restarted  - refusing bipap qhs    CHF: acute diastolic/anasarca   -A 2D Echocardi[facility] on March 26, 2019 ([facility]) shows an EF of 55-60%, X-ray shows pulmonary edema  - BNP 52K -> 4K  - echo in 2017 with lvef 70-75%, mild as, mod pulm htn  -  Monitor strict I&Os: - 2 L x 24 hrs; - 8.4 L total   - daily weights  - lasix 40 mg iv bid, will change to po tomorrow   - troprol, hold lisinopril for hypotension  - Cardiology has been consulted  - The CHF nurse has been consulted to discuss disease management       UTI:  - zosyn/rocephin - will stop tomorrow  - ucx with e coli     Cellulitis of left hand: improved  -Patient has no known mechanism of injury  -IV Rocephin/zosyn, stop tomorrow  -Continue to trend white blood cell

## 2019-07-01 ENCOUNTER — APPOINTMENT (OUTPATIENT)
Dept: GENERAL RADIOLOGY | Age: 79
DRG: 871 | End: 2019-07-01
Payer: MEDICARE

## 2019-07-01 LAB
ANION GAP SERPL CALCULATED.3IONS-SCNC: 13 MMOL/L (ref 3–16)
ANISOCYTOSIS: ABNORMAL
BANDED NEUTROPHILS RELATIVE PERCENT: 8 % (ref 0–7)
BASOPHILS ABSOLUTE: 0.1 K/UL (ref 0–0.2)
BASOPHILS RELATIVE PERCENT: 1 %
BUN BLDV-MCNC: 25 MG/DL (ref 7–20)
CALCIUM SERPL-MCNC: 9 MG/DL (ref 8.3–10.6)
CHLORIDE BLD-SCNC: 95 MMOL/L (ref 99–110)
CO2: 32 MMOL/L (ref 21–32)
CREAT SERPL-MCNC: 0.9 MG/DL (ref 0.6–1.2)
EOSINOPHILS ABSOLUTE: 0.1 K/UL (ref 0–0.6)
EOSINOPHILS RELATIVE PERCENT: 1 %
GFR AFRICAN AMERICAN: >60
GFR NON-AFRICAN AMERICAN: >60
GLUCOSE BLD-MCNC: 100 MG/DL (ref 70–99)
HCT VFR BLD CALC: 38.6 % (ref 36–48)
HEMOGLOBIN: 12.5 G/DL (ref 12–16)
LYMPHOCYTES ABSOLUTE: 0.3 K/UL (ref 1–5.1)
LYMPHOCYTES RELATIVE PERCENT: 3 %
MAGNESIUM: 1.8 MG/DL (ref 1.8–2.4)
MCH RBC QN AUTO: 28.6 PG (ref 26–34)
MCHC RBC AUTO-ENTMCNC: 32.4 G/DL (ref 31–36)
MCV RBC AUTO: 88.2 FL (ref 80–100)
MONOCYTES ABSOLUTE: 0.5 K/UL (ref 0–1.3)
MONOCYTES RELATIVE PERCENT: 5 %
NEUTROPHILS ABSOLUTE: 9.2 K/UL (ref 1.7–7.7)
NEUTROPHILS RELATIVE PERCENT: 82 %
PDW BLD-RTO: 18.8 % (ref 12.4–15.4)
PHOSPHORUS: 3.9 MG/DL (ref 2.5–4.9)
PLATELET # BLD: 493 K/UL (ref 135–450)
PLATELET SLIDE REVIEW: ABNORMAL
PMV BLD AUTO: 7.5 FL (ref 5–10.5)
POTASSIUM SERPL-SCNC: 3.7 MMOL/L (ref 3.5–5.1)
RBC # BLD: 4.37 M/UL (ref 4–5.2)
SLIDE REVIEW: ABNORMAL
SODIUM BLD-SCNC: 140 MMOL/L (ref 136–145)
WBC # BLD: 10.2 K/UL (ref 4–11)

## 2019-07-01 PROCEDURE — 2580000003 HC RX 258: Performed by: INTERNAL MEDICINE

## 2019-07-01 PROCEDURE — 97535 SELF CARE MNGMENT TRAINING: CPT

## 2019-07-01 PROCEDURE — 99232 SBSQ HOSP IP/OBS MODERATE 35: CPT | Performed by: INTERNAL MEDICINE

## 2019-07-01 PROCEDURE — 85025 COMPLETE CBC W/AUTO DIFF WBC: CPT

## 2019-07-01 PROCEDURE — 71046 X-RAY EXAM CHEST 2 VIEWS: CPT

## 2019-07-01 PROCEDURE — 2580000003 HC RX 258: Performed by: NURSE PRACTITIONER

## 2019-07-01 PROCEDURE — 6370000000 HC RX 637 (ALT 250 FOR IP): Performed by: INTERNAL MEDICINE

## 2019-07-01 PROCEDURE — 84100 ASSAY OF PHOSPHORUS: CPT

## 2019-07-01 PROCEDURE — 6370000000 HC RX 637 (ALT 250 FOR IP): Performed by: NURSE PRACTITIONER

## 2019-07-01 PROCEDURE — 83735 ASSAY OF MAGNESIUM: CPT

## 2019-07-01 PROCEDURE — 99232 SBSQ HOSP IP/OBS MODERATE 35: CPT | Performed by: NURSE PRACTITIONER

## 2019-07-01 PROCEDURE — 6370000000 HC RX 637 (ALT 250 FOR IP): Performed by: FAMILY MEDICINE

## 2019-07-01 PROCEDURE — 80048 BASIC METABOLIC PNL TOTAL CA: CPT

## 2019-07-01 PROCEDURE — 6360000002 HC RX W HCPCS: Performed by: NURSE PRACTITIONER

## 2019-07-01 PROCEDURE — 2060000000 HC ICU INTERMEDIATE R&B

## 2019-07-01 PROCEDURE — 6360000002 HC RX W HCPCS: Performed by: INTERNAL MEDICINE

## 2019-07-01 PROCEDURE — 97530 THERAPEUTIC ACTIVITIES: CPT

## 2019-07-01 PROCEDURE — 2700000000 HC OXYGEN THERAPY PER DAY

## 2019-07-01 PROCEDURE — 94760 N-INVAS EAR/PLS OXIMETRY 1: CPT

## 2019-07-01 RX ORDER — FUROSEMIDE 10 MG/ML
40 INJECTION INTRAMUSCULAR; INTRAVENOUS ONCE
Status: COMPLETED | OUTPATIENT
Start: 2019-07-01 | End: 2019-07-01

## 2019-07-01 RX ADMIN — Medication 10 ML: at 21:52

## 2019-07-01 RX ADMIN — FOLIC ACID 1 MG: 1 TABLET ORAL at 09:25

## 2019-07-01 RX ADMIN — AMITRIPTYLINE HYDROCHLORIDE 10 MG: 10 TABLET, FILM COATED ORAL at 21:21

## 2019-07-01 RX ADMIN — LEVOTHYROXINE SODIUM 137 MCG: 25 TABLET ORAL at 05:25

## 2019-07-01 RX ADMIN — Medication 2 CAPSULE: at 17:38

## 2019-07-01 RX ADMIN — PREDNISOLONE ACETATE 1 DROP: 1.2 SUSPENSION/ DROPS OPHTHALMIC at 21:23

## 2019-07-01 RX ADMIN — GUAIFENESIN 600 MG: 600 TABLET, EXTENDED RELEASE ORAL at 09:25

## 2019-07-01 RX ADMIN — DIBASIC SODIUM PHOSPHATE, MONOBASIC POTASSIUM PHOSPHATE AND MONOBASIC SODIUM PHOSPHATE 1 TABLET: 852; 155; 130 TABLET ORAL at 21:21

## 2019-07-01 RX ADMIN — FUROSEMIDE 40 MG: 40 TABLET ORAL at 09:25

## 2019-07-01 RX ADMIN — Medication 10 ML: at 09:26

## 2019-07-01 RX ADMIN — CEFTRIAXONE 2 G: 2 INJECTION, POWDER, FOR SOLUTION INTRAMUSCULAR; INTRAVENOUS at 15:54

## 2019-07-01 RX ADMIN — FLUOXETINE 20 MG: 20 CAPSULE ORAL at 09:25

## 2019-07-01 RX ADMIN — Medication 10 ML: at 21:53

## 2019-07-01 RX ADMIN — DIGOXIN 125 MCG: 125 TABLET ORAL at 09:25

## 2019-07-01 RX ADMIN — DIBASIC SODIUM PHOSPHATE, MONOBASIC POTASSIUM PHOSPHATE AND MONOBASIC SODIUM PHOSPHATE 1 TABLET: 852; 155; 130 TABLET ORAL at 09:25

## 2019-07-01 RX ADMIN — SPIRONOLACTONE 25 MG: 25 TABLET ORAL at 09:25

## 2019-07-01 RX ADMIN — GUAIFENESIN 600 MG: 600 TABLET, EXTENDED RELEASE ORAL at 21:21

## 2019-07-01 RX ADMIN — PREDNISOLONE ACETATE 1 DROP: 1.2 SUSPENSION/ DROPS OPHTHALMIC at 09:29

## 2019-07-01 RX ADMIN — ROPINIROLE HYDROCHLORIDE 0.25 MG: 0.5 TABLET, FILM COATED ORAL at 21:21

## 2019-07-01 RX ADMIN — FUROSEMIDE 40 MG: 10 INJECTION, SOLUTION INTRAMUSCULAR; INTRAVENOUS at 15:54

## 2019-07-01 RX ADMIN — PREDNISONE 5 MG: 5 TABLET ORAL at 09:25

## 2019-07-01 RX ADMIN — HEPARIN SODIUM 5000 UNITS: 5000 INJECTION INTRAVENOUS; SUBCUTANEOUS at 05:24

## 2019-07-01 RX ADMIN — PREDNISOLONE ACETATE 1 DROP: 1.2 SUSPENSION/ DROPS OPHTHALMIC at 15:55

## 2019-07-01 RX ADMIN — BUPROPION HYDROCHLORIDE 150 MG: 150 TABLET, FILM COATED, EXTENDED RELEASE ORAL at 09:25

## 2019-07-01 RX ADMIN — HYDROCODONE BITARTRATE AND ACETAMINOPHEN 1 TABLET: 10; 325 TABLET ORAL at 23:20

## 2019-07-01 RX ADMIN — RIVAROXABAN 15 MG: 15 TABLET, FILM COATED ORAL at 17:38

## 2019-07-01 RX ADMIN — Medication 2 CAPSULE: at 09:24

## 2019-07-01 ASSESSMENT — PAIN DESCRIPTION - FREQUENCY
FREQUENCY: INTERMITTENT
FREQUENCY: INTERMITTENT

## 2019-07-01 ASSESSMENT — PAIN DESCRIPTION - PROGRESSION
CLINICAL_PROGRESSION: GRADUALLY WORSENING
CLINICAL_PROGRESSION: GRADUALLY WORSENING

## 2019-07-01 ASSESSMENT — PAIN SCALES - GENERAL
PAINLEVEL_OUTOF10: 6
PAINLEVEL_OUTOF10: 0

## 2019-07-01 ASSESSMENT — PAIN - FUNCTIONAL ASSESSMENT
PAIN_FUNCTIONAL_ASSESSMENT: ACTIVITIES ARE NOT PREVENTED
PAIN_FUNCTIONAL_ASSESSMENT: ACTIVITIES ARE NOT PREVENTED

## 2019-07-01 ASSESSMENT — PAIN DESCRIPTION - PAIN TYPE
TYPE: CHRONIC PAIN
TYPE: CHRONIC PAIN

## 2019-07-01 ASSESSMENT — PAIN DESCRIPTION - LOCATION
LOCATION: GENERALIZED
LOCATION: GENERALIZED

## 2019-07-01 ASSESSMENT — PAIN DESCRIPTION - DESCRIPTORS
DESCRIPTORS: DISCOMFORT
DESCRIPTORS: DISCOMFORT

## 2019-07-01 ASSESSMENT — PAIN DESCRIPTION - ORIENTATION: ORIENTATION: LEFT

## 2019-07-01 ASSESSMENT — PAIN DESCRIPTION - ONSET
ONSET: PROGRESSIVE
ONSET: PROGRESSIVE

## 2019-07-01 NOTE — PROGRESS NOTES
edema  Skin: Skin color, texture, turgor normal.   Neurologic:  Neurovascularly intact without any focal sensory/motor deficits. Cranial nerves: II-XII intact, grossly non-focal.   Psychiatric:  alert, oriented x3  Capillary Refill: Brisk,< 3 seconds   Peripheral Pulses: +2 palpable, equal bilaterally    Labs:   Recent Labs     06/29/19  0755 06/30/19  0552 07/01/19  0537   WBC 16.6* 11.5* 10.2   HGB 12.9 12.3 12.5   HCT 40.2 38.0 38.6   * 427 493*     Recent Labs     06/29/19  0755 06/30/19  0552 07/01/19  0537   * 140 140   K 3.8 3.9 3.7   CL 92* 96* 95*   CO2 31 30 32   BUN 30* 29* 25*   CREATININE 1.0 0.9 0.9   CALCIUM 9.1 8.8 9.0   PHOS 3.5 4.0 3.9     No results for input(s): AST, ALT, BILIDIR, BILITOT, ALKPHOS in the last 72 hours. No results for input(s): INR in the last 72 hours. No results for input(s): Ardyce Cane in the last 72 hours. Urinalysis:      Lab Results   Component Value Date    NITRU Negative 06/20/2019    WBCUA 33 06/20/2019    RBCUA 0-2 06/20/2019    BLOODU Negative 06/20/2019    SPECGRAV 1.023 06/20/2019    GLUCOSEU Negative 06/20/2019       Radiology:  XR CHEST STANDARD (2 VW)   Preliminary Result   Previously noted findings of interstitial edema have improved. Small right and moderate left pleural effusions have mildly increased in size   since the prior examination. Persistent bibasilar airspace opacities may   reflect atelectasis. XR CHEST PORTABLE   Final Result   Right PICC with tip projecting in the region lower SVC, new. XR CHEST PORTABLE   Final Result   No significant change over the past 48 hours. Bilateral advanced pulmonary   edema, and left pleural effusion with adjacent compressive atelectasis in the   left lung base. CT ABDOMEN PELVIS WO CONTRAST Additional Contrast? None   Final Result   1. Nonspecific bilateral pleural effusion with bibasilar consolidation   unchanged from prior study.    2. Abdominal and pelvic

## 2019-07-01 NOTE — PROGRESS NOTES
[address]   Daily Progress Note      Admit Date:  6/20/2019    CC: fatigue, SOB    HPI:   Perla Scanlon is a [de-identified] y.o. female with PMH congenital heart disease,DHF- care everywhere states \"drug induced (SOB and weight gain in March - seen by PCP and lasix increased and ECHO done, referred to Cardiology but did not F/U), hypothyroid, anxiety, depression, gout, chronic pain syndrome, CKD stage 2 baseline creat 1.2, rheumatoid arthritis, CML (remission w/ maintenance oral chemo). Hx smoking cigarettes    Admitted to [facility] with vague complaints of not feeling well with whole body aches and pains that started the day prior to admission. Associated with poor appetite X3-4 days and not taking prescribed meds. It was not associated with SOB, CP, N/V, fever. She was found to have NOBLE, elevated troponin, elevated BNP with a slow SVT HR 120s. CXR w/ pulm edema and pleural effusions. + UTI with E. Coli. Initially received IVF for NOBLE but HF worsened and was started on lasix. + hypotension w/ SBP 60s and altered MS 6/24 and rapid response called and tx to ICU. ABG indicated severe hypercarbic and hypoxic acidosis- placed on Bipap. Treated for sepsis. Has gradually improved and is now getting diuresed. Neph was following but signed off today. Irregular rhythm and elevated HR noted 3 days ago. EKG read as ST vs Aflutter, tele appears to be A flutter consistently. Tx complicated with hypotension, she was started on Digoxin and has been NSR for the last 2 days. Today she is resting in bed. She has been ambulating in her room and with PT/OT. She admits to some SOB and fatigue with activity. She remains on 2L NC. A repeat CXR was done today with concern for ongoing hypoxia. Review of Systems:   General: Denies fever, chills, +fatigue, +weakness  Skin: Denies skin changes, rash, itching, lesions.    HEENT: Denies headache, dizziness, vision changes, nosebleeds, sore throat, nasal drainage  RESP: Denies cough, sputum,wheeze, snoring  CARD: Denies palpitations, chest pain, edema  GI:Denies nausea, vomiting, heartburn, loss of appetite, change in bowels  : Denies frequency, pain, incontinence, polyuria  VASC: Denies claudication, leg cramps, clots  MUSC/SKEL: Denies pain, stiffness, arthritis  PSYCH: Denies anxiety, depression, stress  NEURO: Denies numbness, tingling, weakness,change in mood or memory  HEME: Denies abn bruising, bleeding, anemia  ENDO: Denies intolerance to heat, cold, excessive thirst or hunger, hx thyroid disease    Objective:   /75   Pulse 81   Temp 98 °F (36.7 °C) (Oral)   Resp 16   Ht 5' 3\" (1.6 m)   Wt 166 lb 0.1 oz (75.3 kg)   SpO2 92%   BMI 29.41 kg/m²        Intake/Output Summary (Last 24 hours) at 7/1/2019 0901  Last data filed at 6/30/2019 1400  Gross per 24 hour   Intake 410 ml   Output 400 ml   Net 10 ml     I/O since adm:  -7, 936    WEIGHT:Admit Weight: 200 lb 13.4 oz (91.1 kg) ED  Admit weight to floor 178#           Today  Weight: 166 lb 0.1 oz (75.3 kg)   DRY WEIGHT:  Wt Readings from Last 3 Encounters:   07/01/19 166 lb 0.1 oz (75.3 kg)   07/25/17 176 lb (79.8 kg)   07/17/17 175 lb (79.4 kg)       Physical Exam:  GEN: Appears fatigued, no acute distress  SKIN: Pink, warm, dry. Nails without clubbing. HEENT: PERRLA. Normocephalic, atraumatic. Neck supple. No adenopathy. LUNG: AP diameter normal. Crackles bilateral bases. No wheeze or ronchi. Respiratory effort slight SOB at rest, patient is restless. HEART: S1S2 A/R. No JVD. No carotid bruit. No murmur, rub or gallop. ABD: Soft, nontender. +BS X 4 quads. No hepatomegaly. EXT: Radial and pedal pulses 2+ and symmetric. Without varicosities. 1+BLE edema. MUSCSKEL: Good ROM X4 extremities. No deformity. NEURO: A/O X3. Calm and cooperative.      Telemetry: A flutter -140s    Medications:    furosemide  40 mg Oral Daily    phosphorus  250 mg Oral BID    digoxin  125 mcg Oral Daily    spironolactone  25 mg Oral Daily    cefTRIAXone (ROCEPHIN) IV  2 g Intravenous Q24H    sodium chloride flush  10 mL Intravenous 2 times per day    lactobacillus  2 capsule Oral BID WC    guaiFENesin  600 mg Oral BID    heparin (porcine)  5,000 Units Subcutaneous 3 times per day    sodium chloride flush  10 mL Intravenous 2 times per day    amitriptyline  10 mg Oral Nightly    buPROPion  150 mg Oral Daily    dasatinib  50 mg Oral Daily    FLUoxetine  20 mg Oral Daily    folic acid  1 mg Oral Daily    levothyroxine  137 mcg Oral Daily    prednisoLONE acetate  1 drop Both Eyes TID    predniSONE  5 mg Oral Daily    rOPINIRole  0.25 mg Oral Nightly       sodium chloride flush, HYDROcodone-acetaminophen, sodium chloride flush, ondansetron, acetaminophen, polyethyl glycol-propyl glycol 0.4-0.3 %    Lab Data: I have reviewed all labs below today. CBC:   Recent Labs     06/29/19 0755 06/30/19  0552 07/01/19  0537   WBC 16.6* 11.5* 10.2   HGB 12.9 12.3 12.5   HCT 40.2 38.0 38.6   MCV 88.9 89.1 88.2   * 427 493*     BMP:   Recent Labs     06/29/19  0755 06/30/19  0552 07/01/19  0537   * 140 140   K 3.8 3.9 3.7   CL 92* 96* 95*   CO2 31 30 32   PHOS 3.5 4.0 3.9   BUN 30* 29* 25*   CREATININE 1.0 0.9 0.9     GLUCOSE:   Recent Labs     06/29/19  0755 06/30/19  0552 07/01/19  0537   GLUCOSE 98 90 100*     LIVER PROFILE:   Lab Results   Component Value Date    AST 14 06/22/2019    ALT 6 06/22/2019    LABALBU 3.0 06/28/2019    BILIDIR <0.2 06/22/2019    BILITOT 0.3 06/22/2019    ALKPHOS 66 06/22/2019     PT/INR: No results found for: PROTIME, INR  APTT: No results found for: APTT  Pro-BNP:    Lab Results   Component Value Date    PROBNP 4,861 06/28/2019    PROBNP 16,715 06/26/2019    PROBNP 26,073 06/25/2019     ENZYMES:  Lab Results   Component Value Date    CKTOTAL 47 06/22/2019    TROPONINI 0.28 06/21/2019   Results for Rupali Bound (MRN [id number]) as of 6/27/2019 09:48   Ref.  Range 6/20/2019 17:16 6/20/2019 23:11 6/21/2019 03:16   Troponin Latest Ref Range: <0.01 ng/mL 0.25 (H) 0.27 (H) 0.28 (H)     FASTING LIPID PANEL:  Lab Results   Component Value Date    CHOL 114 06/21/2019    HDL 23 06/21/2019    LDLCALC 69 06/21/2019    TRIG 109 06/21/2019       Diagnostics:    EKG: Supraventricular tachycardia with retrograde conductionLeft axis deviationMinimal voltage criteria for LVH, may be normal variantDelayed transitionNonspecific ST and T wave abnormalityAbnormal ECGWhen compared with ECG of 21-JUN-2019 08:03,No significant change was foundConfirmed by DEE TAMAYO, Nicky Hoang ([id number]) on 6/21/2019 6:20:18 PM    Sinus tachycardia with short HI Vs   a flutterST & T wave abnormality, consider anterior ischemiaAbnormal ECGWhen compared with ECG of 27-JUN-2019 10:05,Pt has probably developed A flutter. Clinical correaltion is necessary. Non-specific change in ST segment in Anterolateral leadsConfirmed by Cheryl Mcmanus MD, [facility] ([id number]) on 6/28/2019 11:55:25 AM    ECHO:  3/2019  - Left ventricle: The cavity size was normal. Wall thickness was normal. Systolic function was normal. The calculated ejection fraction was in the range of 55% to 60%. Wall motion was normal; there were no regional wall motion abnormalities. Doppler parameters are consistent with abnormal left ventricular relaxation (grade 1 diastolic dysfunction). Stroke volume: 66ml.  Stroke volume/bsa: 36ml/m^2. - Aortic valve: Moderate focal thickening and calcification. Cusp separation was moderately reduced. There was moderate stenosis. There was trivial regurgitation. Mean gradient (S): 14mm Hg. Peak gradient (S): 26mm Hg. Valve area (VTI): 1.2cm^2. Indexed valve area (VTI): 0.66cm^2/m^2. Valve area (Vmax): 1.25cm^2. Indexed valve area (Vmax): 0.69cm^2/m^2. - Aorta: Aortic root dimension: 3.9cm (ED). Ascending aortic diameter: 4.4cm (S).  - Mitral valve: The annulus was mildly calcified. Mild thickening and calcification of the posterior leaflet.   - Left atrium: The atrium was UTI and sepsis now improving. NYHA Class III  Diuretic: lasix 40mg po daily- Will give extra dose today- 40mg IV  Aldosterone Antagonist: spironolactone 25mg po daily- hold for SBP < 100  2gm Na diet, daily weight, 64 oz fluid restriction     3.) Acute Resp Failure: Multifactorial R/T HF, pulm edema and pulm effusions. Bipap and O2 per Pulm. 4.) Aortic Stenosis: Stable    5.) Demand Ischemia/ NSTEMI: History is not consistent with ACS. Elevated troponin may be related to CKD, infection, and tachyarrhythmia. Cannot exclude underlying CAD but no plans for stress testing or angiography.      6.) NOBLE on CKD: Resolved. Followed by Neph. Creatinine improved and tolerating diuresis. 7.) UTI/ possible sepsis: + E Coli in urine. Tx per Primary team with antibiotics.       Electronically signed by EUGENIA Triplett CNP on 7/1/2019 at 9:01 AM

## 2019-07-01 NOTE — PROGRESS NOTES
Pulmonary Progress Note    Date of Admission: 6/20/2019   LOS: 11 days     CC:  Chief Complaint   Patient presents with    Other     not eating or drinking normally cant remember when she ate last    f/u hypoxia    HPI/Subjective  NO events o/n. Remains between 0.5-1.5L O2. No acute comoplaints. Feels like she is improving. ROS:   No nausea  No Vomiting  No chest pain      Intake/Output Summary (Last 24 hours) at 7/1/2019 1629  Last data filed at 7/1/2019 1036  Gross per 24 hour   Intake 240 ml   Output --   Net 240 ml         PHYSICAL EXAM:   Blood pressure 120/74, pulse 80, temperature 97.9 °F (36.6 °C), temperature source Oral, resp. rate 16, height 5' 3\" (1.6 m), weight 166 lb 0.1 oz (75.3 kg), SpO2 99 %, not currently breastfeeding.'  Gen:  No acute distress. Laying in bed  Eyes: PERRL. Anicteric sclera. No conjunctival injection. ENT: No discharge. Posterior oropharynx clear. External appearance of ears and nose normal.  Neck: Trachea midline. No mass   Resp: Bibasilar crackles. No wheezes. No rhonchi. No dullness on percussion. CV: Regular rate. Irregular rhythm. 3/6 systolic ejection murmur. . No edema. GI: Soft, Non-tender. Non-distended. +BS  Skin: Warm, dry, w/o erythema. Lymph: No cervical or supraclavicular LAD. M/S: No cyanosis. No clubbing. Neuro: no focal neurologic deficit. Moves all extremities  Psych: awake and alert. Mood stable.       Medications:    Scheduled Meds:   rivaroxaban  15 mg Oral Dinner    furosemide  40 mg Oral Daily    phosphorus  250 mg Oral BID    digoxin  125 mcg Oral Daily    spironolactone  25 mg Oral Daily    sodium chloride flush  10 mL Intravenous 2 times per day    lactobacillus  2 capsule Oral BID WC    guaiFENesin  600 mg Oral BID    sodium chloride flush  10 mL Intravenous 2 times per day    amitriptyline  10 mg Oral Nightly    buPROPion  150 mg Oral Daily    dasatinib  50 mg Oral Daily    FLUoxetine  20 mg Oral Daily    folic acid on the  left, all of which are increased when compared to the previous exam.  No  pneumothorax is found. No free air. No acute bony abnormality. Impression Findings most compatible with worsening or recurrent pulmonary edema. Correlate with any clinical evidence of superimposed infection. CXR portable:   Results for orders placed during the hospital encounter of 06/20/19   XR CHEST PORTABLE    Narrative EXAMINATION:  ONE XRAY VIEW OF THE CHEST    6/24/2019 4:26 pm    COMPARISON:  June 24, 2019 9:49    HISTORY:  ORDERING SYSTEM PROVIDED HISTORY: PICC line placement  TECHNOLOGIST PROVIDED HISTORY:  Reason for exam:->PICC line placement  Ordering Physician Provided Reason for Exam: picc placement    FINDINGS:  Cardiac silhouette is enlarged. Right PICC with tip projecting in the region  of the lower SVC. No pneumothorax. Small bilateral pleural effusions and  interstitial edema, similar to previous exam.      Impression Right PICC with tip projecting in the region lower SVC, new. Access  Arterial       PICC       PICC Double Lumen 06/24/19 Right Brachial (Active)   Continued need for line? Yes 6/25/2019  8:00 AM   Is this a power PICC? Yes 6/25/2019  8:00 AM   Site Assessment Clean;Dry; Intact 6/25/2019  8:00 AM   Ardeth Kelsey Lumen Status Alcohol cap applied;Blood return noted; Flushed;Normal saline locked 6/25/2019  8:00 AM   Purple Lumen Status Alcohol cap applied;Blood return noted; Flushed; Infusing 6/25/2019  8:00 AM   Exposed Catheter (cm) 1 cm 6/24/2019  4:21 PM   Extremity Circumference (cm) 28 cm 6/24/2019  4:21 PM   Line Care Connections checked and tightened 6/24/2019  4:21 PM   Dressing Status Clean;Dry; Intact 6/25/2019  8:00 AM   Dressing Type Transparent; Anti-microbial patch; Securing device 6/25/2019  8:00 AM   Dressing Change Due 06/26/19 6/25/2019  8:00 AM   Dressing Intervention New 6/24/2019  4:21 PM   Number of days: 0        CVC                 Assessment:     Acute Hypoxia  Acute CHF  Pulmonary edema  Acute cystitis, E. coli  Pulmonary hypertension  Atrial fibrillation  Rheumatoid arthritis    Plan:      -Wean supplemental oxygen to goal saturation of >90%  -still hypoxic, repeat cxr today   -lasix titrated down to 40mg PO  -started on digoxin  -finishing abx today  -pred 5mg daily chronic, RA  -she really needs to get out of Bed and participated with PT/OT.       Carol [address] [place] [facility]

## 2019-07-01 NOTE — CARE COORDINATION
Case Management:  Follow up with patient re discharge needs and the plan remains the same which is to return home with home care and 24/7 assistance from son. Discussed need for shower chair and patient does not think she will need it. Discussed home OT eval which can assess that need with her and she agrees. Discussed need for wheeled walker at discharge and she has no agency preference. Referral make to [facility] to follow . IMM discussed questions answered.   Electronically signed by Preethi Garsia on 7/1/2019 at 12:59 [id number]

## 2019-07-02 VITALS
DIASTOLIC BLOOD PRESSURE: 88 MMHG | WEIGHT: 165.79 LBS | OXYGEN SATURATION: 92 % | HEIGHT: 63 IN | TEMPERATURE: 98.1 F | RESPIRATION RATE: 16 BRPM | HEART RATE: 86 BPM | BODY MASS INDEX: 29.38 KG/M2 | SYSTOLIC BLOOD PRESSURE: 143 MMHG

## 2019-07-02 LAB
ANION GAP SERPL CALCULATED.3IONS-SCNC: 12 MMOL/L (ref 3–16)
ANISOCYTOSIS: ABNORMAL
BANDED NEUTROPHILS RELATIVE PERCENT: 1 % (ref 0–7)
BASOPHILS ABSOLUTE: 0 K/UL (ref 0–0.2)
BASOPHILS RELATIVE PERCENT: 0 %
BUN BLDV-MCNC: 24 MG/DL (ref 7–20)
CALCIUM SERPL-MCNC: 8.9 MG/DL (ref 8.3–10.6)
CHLORIDE BLD-SCNC: 95 MMOL/L (ref 99–110)
CO2: 33 MMOL/L (ref 21–32)
CREAT SERPL-MCNC: 1 MG/DL (ref 0.6–1.2)
EOSINOPHILS ABSOLUTE: 0.3 K/UL (ref 0–0.6)
EOSINOPHILS RELATIVE PERCENT: 3 %
GFR AFRICAN AMERICAN: >60
GFR NON-AFRICAN AMERICAN: 53
GLUCOSE BLD-MCNC: 97 MG/DL (ref 70–99)
HCT VFR BLD CALC: 38.6 % (ref 36–48)
HEMOGLOBIN: 12.4 G/DL (ref 12–16)
LYMPHOCYTES ABSOLUTE: 0.4 K/UL (ref 1–5.1)
LYMPHOCYTES RELATIVE PERCENT: 4 %
MAGNESIUM: 1.8 MG/DL (ref 1.8–2.4)
MCH RBC QN AUTO: 28.5 PG (ref 26–34)
MCHC RBC AUTO-ENTMCNC: 32.3 G/DL (ref 31–36)
MCV RBC AUTO: 88.2 FL (ref 80–100)
METAMYELOCYTES RELATIVE PERCENT: 3 %
MONOCYTES ABSOLUTE: 0.8 K/UL (ref 0–1.3)
MONOCYTES RELATIVE PERCENT: 9 %
MYELOCYTE PERCENT: 2 %
NEUTROPHILS ABSOLUTE: 7.7 K/UL (ref 1.7–7.7)
NEUTROPHILS RELATIVE PERCENT: 78 %
OVALOCYTES: ABNORMAL
PDW BLD-RTO: 18.8 % (ref 12.4–15.4)
PHOSPHORUS: 4.4 MG/DL (ref 2.5–4.9)
PLATELET # BLD: 463 K/UL (ref 135–450)
PLATELET SLIDE REVIEW: ABNORMAL
PMV BLD AUTO: 7.6 FL (ref 5–10.5)
POTASSIUM SERPL-SCNC: 3.6 MMOL/L (ref 3.5–5.1)
RBC # BLD: 4.37 M/UL (ref 4–5.2)
SLIDE REVIEW: ABNORMAL
SODIUM BLD-SCNC: 140 MMOL/L (ref 136–145)
WBC # BLD: 9.2 K/UL (ref 4–11)

## 2019-07-02 PROCEDURE — 85025 COMPLETE CBC W/AUTO DIFF WBC: CPT

## 2019-07-02 PROCEDURE — 97535 SELF CARE MNGMENT TRAINING: CPT

## 2019-07-02 PROCEDURE — 6370000000 HC RX 637 (ALT 250 FOR IP): Performed by: INTERNAL MEDICINE

## 2019-07-02 PROCEDURE — 97530 THERAPEUTIC ACTIVITIES: CPT | Performed by: PHYSICAL THERAPIST

## 2019-07-02 PROCEDURE — 97116 GAIT TRAINING THERAPY: CPT | Performed by: PHYSICAL THERAPIST

## 2019-07-02 PROCEDURE — 94760 N-INVAS EAR/PLS OXIMETRY 1: CPT

## 2019-07-02 PROCEDURE — 84100 ASSAY OF PHOSPHORUS: CPT

## 2019-07-02 PROCEDURE — 6370000000 HC RX 637 (ALT 250 FOR IP): Performed by: NURSE PRACTITIONER

## 2019-07-02 PROCEDURE — 6370000000 HC RX 637 (ALT 250 FOR IP): Performed by: FAMILY MEDICINE

## 2019-07-02 PROCEDURE — 80048 BASIC METABOLIC PNL TOTAL CA: CPT

## 2019-07-02 PROCEDURE — 99232 SBSQ HOSP IP/OBS MODERATE 35: CPT | Performed by: INTERNAL MEDICINE

## 2019-07-02 PROCEDURE — 83735 ASSAY OF MAGNESIUM: CPT

## 2019-07-02 PROCEDURE — 2700000000 HC OXYGEN THERAPY PER DAY

## 2019-07-02 PROCEDURE — 99232 SBSQ HOSP IP/OBS MODERATE 35: CPT | Performed by: NURSE PRACTITIONER

## 2019-07-02 PROCEDURE — 94669 MECHANICAL CHEST WALL OSCILL: CPT

## 2019-07-02 RX ORDER — SPIRONOLACTONE 25 MG/1
25 TABLET ORAL DAILY
Qty: 30 TABLET | Refills: 1 | Status: SHIPPED | OUTPATIENT
Start: 2019-07-03

## 2019-07-02 RX ADMIN — BUPROPION HYDROCHLORIDE 150 MG: 150 TABLET, FILM COATED, EXTENDED RELEASE ORAL at 07:43

## 2019-07-02 RX ADMIN — FUROSEMIDE 40 MG: 40 TABLET ORAL at 07:43

## 2019-07-02 RX ADMIN — SPIRONOLACTONE 25 MG: 25 TABLET ORAL at 07:43

## 2019-07-02 RX ADMIN — DIGOXIN 125 MCG: 125 TABLET ORAL at 07:43

## 2019-07-02 RX ADMIN — LEVOTHYROXINE SODIUM 137 MCG: 25 TABLET ORAL at 05:47

## 2019-07-02 RX ADMIN — DIBASIC SODIUM PHOSPHATE, MONOBASIC POTASSIUM PHOSPHATE AND MONOBASIC SODIUM PHOSPHATE 1 TABLET: 852; 155; 130 TABLET ORAL at 07:43

## 2019-07-02 RX ADMIN — FLUOXETINE 20 MG: 20 CAPSULE ORAL at 07:43

## 2019-07-02 RX ADMIN — GUAIFENESIN 600 MG: 600 TABLET, EXTENDED RELEASE ORAL at 07:43

## 2019-07-02 RX ADMIN — PREDNISOLONE ACETATE 1 DROP: 1.2 SUSPENSION/ DROPS OPHTHALMIC at 14:28

## 2019-07-02 RX ADMIN — PREDNISONE 5 MG: 5 TABLET ORAL at 07:43

## 2019-07-02 RX ADMIN — PREDNISOLONE ACETATE 1 DROP: 1.2 SUSPENSION/ DROPS OPHTHALMIC at 07:45

## 2019-07-02 RX ADMIN — FOLIC ACID 1 MG: 1 TABLET ORAL at 07:43

## 2019-07-02 RX ADMIN — Medication 2 CAPSULE: at 07:43

## 2019-07-02 ASSESSMENT — PAIN SCALES - GENERAL
PAINLEVEL_OUTOF10: 0
PAINLEVEL_OUTOF10: 0

## 2019-07-02 NOTE — PLAN OF CARE
Nutrition Problem: Altered nutrition-related lab values  Intervention: Food and/or Nutrient Delivery: Continue current diet, Continue current ONS  Nutritional Goals: consume >/= to 50 %
Problem: OXYGENATION/RESPIRATORY FUNCTION  Goal: Patient will maintain patent airway  6/21/2019 2331 by Bria Cook RN  Outcome: Ongoing     Problem: OXYGENATION/RESPIRATORY FUNCTION  Goal: Patient will achieve/maintain normal respiratory rate/effort  Description  Respiratory rate and effort will be within normal limits for the patient  6/21/2019 2331 by Bria Cook RN  Outcome: Ongoing     Problem: HEMODYNAMIC STATUS  Goal: Patient has stable vital signs and fluid balance  6/21/2019 2331 by Bria Cook RN  Outcome: Ongoing     Problem: FLUID AND ELECTROLYTE IMBALANCE  Goal: Fluid and electrolyte balance are achieved/maintained  6/21/2019 2331 by Bria Cook RN  Outcome: Ongoing     Problem: ACTIVITY INTOLERANCE/IMPAIRED MOBILITY  Goal: Mobility/activity is maintained at optimum level for patient  6/21/2019 2331 by Bria Cook RN  Outcome: Ongoing     Problem: Falls - Risk of:  Goal: Will remain free from falls  Description  Will remain free from falls  6/21/2019 2331 by Bria Cook RN  Outcome: Ongoing     Problem: Falls - Risk of:  Goal: Absence of physical injury  Description  Absence of physical injury  6/21/2019 2331 by Bria Cook RN  Outcome: Ongoing     Problem: Pain:  Goal: Pain level will decrease  Description  Pain level will decrease  6/21/2019 2331 by Bria Cook RN  Outcome: Ongoing     Problem: Pain:  Goal: Control of acute pain  Description  Control of acute pain  6/21/2019 2331 by Bria Cook RN  Outcome: Ongoing     Problem: Pain:  Goal: Control of chronic pain  Description  Control of chronic pain  6/21/2019 2331 by Bria Cook RN  Outcome: Ongoing     Problem: Risk for Impaired Skin Integrity  Goal: Tissue integrity - skin and mucous membranes  Description  Structural intactness and normal physiological function of skin and  mucous membranes.   6/21/2019 2331 by Bria Cook RN  Outcome: Ongoing
Problem: OXYGENATION/RESPIRATORY FUNCTION  Goal: Patient will maintain patent airway  7/1/2019 0630 by Kentrell Galeana RN  Outcome: Ongoing     Problem: HEMODYNAMIC STATUS  Goal: Patient has stable vital signs and fluid balance  7/1/2019 0630 by Kentrell Galeana RN  Outcome: Ongoing     Problem: FLUID AND ELECTROLYTE IMBALANCE  Goal: Fluid and electrolyte balance are achieved/maintained  7/1/2019 0630 by Kentrell Galeana RN  Outcome: Ongoing     Problem: ACTIVITY INTOLERANCE/IMPAIRED MOBILITY  Goal: Mobility/activity is maintained at optimum level for patient  7/1/2019 0630 by Kentrell Galeana RN  Outcome: Ongoing     Problem: Falls - Risk of:  Goal: Will remain free from falls  Description  Will remain free from falls  7/1/2019 0630 by Kentrell Galeana RN  Outcome: Ongoing     Problem: Pain:  Goal: Pain level will decrease  Description  Pain level will decrease  7/1/2019 0630 by Kentrell Galeana RN  Outcome: Ongoing
Problem: OXYGENATION/RESPIRATORY FUNCTION  Goal: Patient will maintain patent airway  Outcome: Ongoing  Goal: Patient will achieve/maintain normal respiratory rate/effort  Description  Respiratory rate and effort will be within normal limits for the patient  Outcome: Ongoing     Problem: HEMODYNAMIC STATUS  Goal: Patient has stable vital signs and fluid balance  Outcome: Ongoing     Problem: FLUID AND ELECTROLYTE IMBALANCE  Goal: Fluid and electrolyte balance are achieved/maintained  Outcome: Ongoing     Problem: ACTIVITY INTOLERANCE/IMPAIRED MOBILITY  Goal: Mobility/activity is maintained at optimum level for patient  Outcome: Ongoing     Problem: Falls - Risk of:  Goal: Will remain free from falls  Description  Will remain free from falls  6/27/2019 1048 by Ruth Mason RN  Outcome: Ongoing  6/27/2019 0036 by May Paget, RN  Outcome: Ongoing  Goal: Absence of physical injury  Description  Absence of physical injury  Outcome: Ongoing     Problem: Pain:  Goal: Pain level will decrease  Description  Pain level will decrease  Outcome: Ongoing  Goal: Control of acute pain  Description  Control of acute pain  6/27/2019 1048 by Ruth Mason RN  Outcome: Ongoing  6/27/2019 0036 by May Paget, RN  Outcome: Ongoing  Goal: Control of chronic pain  Description  Control of chronic pain  Outcome: Ongoing     Problem: Risk for Impaired Skin Integrity  Goal: Tissue integrity - skin and mucous membranes  Description  Structural intactness and normal physiological function of skin and  mucous membranes.   Outcome: Ongoing     Problem: Nutrition  Goal: Optimal nutrition therapy  Outcome: Ongoing
membranes.   6/30/2019 0234 by Manoj Lauren RN  Outcome: Ongoing     Problem: Nutrition  Goal: Optimal nutrition therapy  6/30/2019 0234 by Manoj Lauren RN  Outcome: Ongoing        Problem: OXYGENATION/RESPIRATORY FUNCTION  Goal: Patient will achieve/maintain normal respiratory rate/effort  Description  Respiratory rate and effort will be within normal limits for the patient  6/30/2019 0234 by Manoj Lauren RN  Outcome: Ongoing

## 2019-07-02 NOTE — PROGRESS NOTES
%(improved )  · Oral Nutrition Supplement (ONS) Orders: Frozen Oral Supplement  · ONS intake: 51-75%, %(per pt)  · Anthropometric Measures:  · Ht: 5' 3\" (160 cm)   · Current Body Wt: 166 lb (75.3 kg)  · Admission Body Wt: 201 lb (91.2 kg)  · % Weight Change:  ,  Pt with wt loss r/t fluid shifts as well as decreased po  · Ideal Body Wt: 125 lb (56.7 kg), % Ideal Body    · BMI Classification: BMI 25.0 - 29.9 Overweight    Nutrition Interventions:   Continue current diet, Continue current ONS  Continued Inpatient Monitoring, Education Completed    Nutrition Evaluation:   · Evaluation: Progressing toward goals   · Goals: consume >/= to 50 %    · Monitoring: Meal Intake, Supplement Intake, Skin Integrity, Weight, Pertinent Labs, Diarrhea, Constipation      Electronically signed by Blake Aponte RD, LD on 7/2/19 at 10:59 AM    Contact Number: [phone]

## 2019-07-02 NOTE — DISCHARGE SUMMARY
Final Result   No significant change over the past 48 hours. Bilateral advanced pulmonary   edema, and left pleural effusion with adjacent compressive atelectasis in the   left lung base. CT ABDOMEN PELVIS WO CONTRAST Additional Contrast? None   Final Result   1. Nonspecific bilateral pleural effusion with bibasilar consolidation   unchanged from prior study. 2. Abdominal and pelvic ascites and anasarca presumably related to fluid   volume overload. 3. No urinary collecting system calculus evident. 4. Main pulmonary artery dilatation can be seen with pulmonary hypertension. 5. 10 mm hyperdense nodule inferior pole left kidney almost certainly   reflects hemorrhagic cyst.  Follow-up CT abdomen in 6-12 months recommended   to ensure stability. 6. Mild anasarca. 7. Calcific atherosclerotic disease aorta. 8. Small, fat and ascites containing inguinal hernias bilateral.   9. Moderate size hiatal hernia. 10. Advanced degenerative changes lumbar spine at L4-5 with grade 2   anterolisthesis L4 on L5. XR CHEST PORTABLE   Final Result   Cardiomegaly with radiographic worsening of pulmonary edema and persistent   left larger than right pleural effusions         US RENAL COMPLETE   Final Result   Mild diffuse increased echogenicity of the kidneys consistent with medical   renal disease. CT CHEST WO CONTRAST   Final Result   1. Interstitial pulmonary edema, moderate to large bilateral pleural   effusions, and mild cardiomegaly, suggesting congestive heart failure. 2. Passive atelectasis in the bilateral lower lobes. Superimposed pneumonia   or aspiration may be present bilaterally. 3. Findings suggestive of pulmonary hypertension. 4. Perivascular stranding adjacent to the splenic hilum, an indeterminate   finding that could be related to underlying splenic vein thrombosis. Consider further evaluation with sonography or a contrast-enhanced abdomen   and pelvis CT.    5. Trace

## 2019-07-02 NOTE — PROGRESS NOTES
Physical Therapy  Facility/Department: [facility] PROGRESSIVE CARE  Daily Treatment Note  NAME: Ben Hill  : 1940  MRN: [id number]    Date of Service: 2019    Discharge Recommendations:  Home with assist PRN, 2-3 sessions per week, S Level 3   PT Equipment Recommendations  Equipment Needed: Yes  Walker: [id number] scored a 18/24 on the AM-PAC short mobility form. Current research shows that an AM-PAC score of 18 or greater is typically associated with a discharge to the patient's home setting. Based on the patients AM-PAC score and their current functional mobility deficits, it is recommended that the patient have initially front loaded for safety 5x/wk then 2-3 sessions per week of Physical Therapy at d/c to increase the patients independence. HOME HEALTH CARE: LEVEL 3 SAFETY        -Initial home health evaluation to occur within 24-48 hours, in patient home    -Home health agency to establish plan of care for patient over 60 day period    -Medication Reconciliation    -PT/OT/Speech evaluations in home within 24-48 hours of discharge; including  -DME and home safety    -Frontload therapy 5 days, then 3x a week    -OT to evaluate if patient has [address] needs for personal care    - evaluation within 24-48 hours, includes evaluation of resources   and insurance to determine [place], [place], LTC, and Medicaid options    -PCP Visit scheduled within three to seven days of discharge       Assessment   Assessment: [de-identified] y/o female admit 18 with NSTEMI, NOBLE. PMH as noted including CKD, CHF, CML, RA, Chronic Idiopathic Gout, B TKRs, Chronic Pain. PTA pt living with  (currently in SNF setting) and son (able to provide assist/support) in home iwth 2 step to enter and bed/bath on 1st floor. PTA pt reports able to complete daily care and functional mobility (with cane) independently. Pt reports adequate assist/support by son for d/c home.   Will recommend Home PT Services; will monitor pt's progress. Prognosis: Good  Patient Education: reviewed call light and not getting up without assist  REQUIRES PT FOLLOW UP: Yes  Activity Tolerance  Activity Tolerance: Patient limited by endurance     Patient Diagnosis(es): The primary encounter diagnosis was NSTEMI (non-ST elevated myocardial infarction) ([facility] [address] Diagnoses of NOBLE (acute kidney injury) ([facility] [address] Dehydration, Leukocytosis, unspecified type, Elevated brain natriuretic peptide (BNP) level, and Acute cystitis with hematuria were also pertinent to this visit. has a past medical history of Acquired hypothyroidism, Anxiety and depression, Arthritis, rheumatoid (HCC), Chronic idiopathic gout of multiple sites, Chronic pain syndrome, CKD (chronic kidney disease) stage 2, GFR 60-89 ml/min, CML (chronic myelocytic leukemia) ([facility] [address] Congenital heart disease, Depression, H/O: CML (chronic myeloid leukemia), History of CHF (congestive heart failure), Hypothyroid, and Rheumatoid arthritis of multiple sites with negative rheumatoid factor [facility] has a past surgical history that includes Total knee arthroplasty (Bilateral, 2008); Gallbladder surgery; and Cholecystectomy. Restrictions  Restrictions/Precautions  Restrictions/Precautions: Fall Risk  Position Activity Restriction  Other position/activity restrictions: O2 1L via NC. Subjective   General  Chart Reviewed: Yes  Additional Pertinent Hx: [de-identified] y/o female admit 6/20/19 with NSTEMI, NOBLE. PMH as noted including CKD, CHF, CML, RA, Chronic Idiopathic Gout, B TKRs, Chronic Pain. Response To Previous Treatment: Patient with no complaints from previous session. Family / Caregiver Present: No  Referring Practitioner: Quinton Mead NP.   Subjective  Subjective: pt agreeable to therapy; denies any pain          Orientation  Orientation  Overall Orientation Status: Within Functional Limits  Cognition   Cognition  Overall Cognitive Status: WFL  Objective   Bed mobility  Supine to Sit: Stand

## 2019-07-02 NOTE — PROGRESS NOTES
Occupational Therapy  Facility/Department: [address] PROGRESSIVE CARE  Daily Treatment Note  NAME: Blue Chaudhary  : 1940  MRN: [id number]    Date of Service: 2019    Discharge Recommendations:  Home with Home health OT, S Level 3, Home with assist PRN  OT Equipment Recommendations  Other: Pt would likely benefit from shower chair or tub transfer bench at [address] scored a 18/24 on the AM-PAC ADL Inpatient form. Current research shows that an AM-PAC score of 18 or greater is typically associated with a discharge to the patient's home setting. Based on the patients AM-PAC score and their current ADL deficits, it is recommended that the patient have 2-3 sessions per week of Occupational Therapy at d/c to increase the patients independence. Assessment   Performance deficits / Impairments: Decreased functional mobility ; Decreased ADL status; Decreased safe awareness;Decreased strength;Decreased endurance;Decreased ROM; Decreased balance  Assessment: Pt tolerated tx session well this date and continues to make good progress toward goals. Pt completed fxl transfers and mobility in room with SBA, RW. Pt completed toileting with min A, grooming tasks in stance at sink with SBA. Pt demo'd improved activity tolerance and endurance this date with O2 sats remaining above 92% on RA throughout session. Continue per OT POC  Treatment Diagnosis: decreased ADLs, decreased fxl transfers  Prognosis: Fair  Activity Tolerance  Activity Tolerance: Patient Tolerated treatment well  Activity Tolerance: Pt on RA this session--O2 sats remain above 92%  Safety Devices  Safety Devices in place: Yes  Type of devices: Left in bed;Call light within reach         Patient Diagnosis(es): The primary encounter diagnosis was NSTEMI (non-ST elevated myocardial infarction) ([facility] [address]  Diagnoses of NOBLE (acute kidney injury) ([facility] [address] Dehydration, Leukocytosis, unspecified type, Elevated brain natriuretic peptide (BNP) level, and

## 2019-07-02 NOTE — PROGRESS NOTES
Pulmonary Progress Note    Date of Admission: 6/20/2019   LOS: 12 days     CC:  Chief Complaint   Patient presents with    Other     not eating or drinking normally cant remember when she ate last    f/u hypoxia    HPI/Subjective  1LPM o2. Sitting up in bed but says she feels better. Breathign improved. ROS:   No nausea  No Vomiting  No chest pain      Intake/Output Summary (Last 24 hours) at 7/2/2019 0844  Last data filed at 7/2/2019 0026  Gross per 24 hour   Intake 240 ml   Output 600 ml   Net -360 ml         PHYSICAL EXAM:   Blood pressure 127/69, pulse 80, temperature 97.3 °F (36.3 °C), temperature source Oral, resp. rate 18, height 5' 3\" (1.6 m), weight 165 lb 12.6 oz (75.2 kg), SpO2 93 %, not currently breastfeeding.'  Gen:  No acute distress. Laying in bed  Eyes: PERRL. Anicteric sclera. No conjunctival injection. ENT: No discharge. Posterior oropharynx clear. External appearance of ears and nose normal.  Neck: Trachea midline. No mass   Resp: Bibasilar crackles. No wheezes. No rhonchi. No dullness on percussion. CV: Regular rate. 3/6 systolic ejection murmur. No edema. GI: Soft, Non-tender. Non-distended. +BS  Skin: Warm, dry, w/o erythema. Lymph: No cervical or supraclavicular LAD. M/S: No cyanosis. No clubbing. Neuro: no focal neurologic deficit. Moves all extremities  Psych: awake and alert. Mood stable.       Medications:    Scheduled Meds:   rivaroxaban  15 mg Oral Dinner    furosemide  40 mg Oral Daily    phosphorus  250 mg Oral BID    digoxin  125 mcg Oral Daily    spironolactone  25 mg Oral Daily    sodium chloride flush  10 mL Intravenous 2 times per day    lactobacillus  2 capsule Oral BID WC    guaiFENesin  600 mg Oral BID    sodium chloride flush  10 mL Intravenous 2 times per day    amitriptyline  10 mg Oral Nightly    buPROPion  150 mg Oral Daily    dasatinib  50 mg Oral Daily    FLUoxetine  20 mg Oral Daily    folic acid  1 mg Oral Daily    levothyroxine radiation dose to as low as reasonably  achievable. COMPARISON:  Chest radiograph 06/20/2019    HISTORY:  ORDERING SYSTEM PROVIDED HISTORY: DYSPNEA, ON EXERTION  TECHNOLOGIST PROVIDED HISTORY:  Ordering Physician Provided Reason for Exam: Dyspnea, on exertion; hypoxia,  abnormal CXR. Rheumatoid arthritis. CML. Acuity: Acute  Type of Exam: Subsequent/Follow-up    FINDINGS:  Lack of intravenous contrast administration, partially limiting evaluation of  the soft tissues and vasculature. MEDIASTINUM:  Mild cardiomegaly. Trace pericardial effusion. Marked  dilation of the pulmonary trunk out of proportion with the ascending thoracic  aorta. Mitral and aortic valve annular calcifications. Aortic valve leaflet  calcifications, which can be seen with aortic valve stenosis. Mild coronary  and systemic atherosclerotic calcifications. Normal variant common origin of  the brachiocephalic and left common carotid arteries. Borderline to mildly  enlarged subcarinal and right hilar lymph nodes, likely reactive. No obvious  hilar lymphadenopathy. Partially calcified right paraesophageal nodes,  likely sequelae of prior granulomatous disease. LUNGS/PLEURA:  Dilation of intrapulmonary pulmonary artery branches out of  proportion with accompanying bronchi. Patent central airways. Smooth  interlobular septal thickening especially near the lung apices and bases. Subpleural and peribronchovascular emphysematous changes in the mid to  basilar lungs, potentially panacinar. Noncalcified solid nodules in the  right upper lobe, measuring up to 0.3 cm. Calcified granuloma in the right  lower lobe. Passive atelectasis predominantly in the partial collapse  bilateral lower lobes with some associated air bronchograms. Moderate right  and moderate to large left pleural effusions. No pneumothoraces. UPPER ABDOMEN:  Hepatic and splenic granulomatous calcifications. Mild to  moderate pancreatic atrophy.   Mild splenomegaly. Perivesical stranding near  the splenic hilum. Large sliding hiatal hernia containing the pancreatic  tail and the majority of the stomach with no associated volvulus. SOFT TISSUES/BONES:   Mild subcutaneous edema. No supraclavicular nor  axillary lymphadenopathy. Diffuse osseous demineralization. No acute  fractures nor suspicious osseous lesions. Healed fractures of the manubrium  and posterior right 10th rib. Nonunited remote fracture of the  posterolateral left 10th rib with suspected pseudoarthrosis formation. Impression 1. Interstitial pulmonary edema, moderate to large bilateral pleural  effusions, and mild cardiomegaly, suggesting congestive heart failure. 2. Passive atelectasis in the bilateral lower lobes. Superimposed pneumonia  or aspiration may be present bilaterally. 3. Findings suggestive of pulmonary hypertension. 4. Perivascular stranding adjacent to the splenic hilum, an indeterminate  finding that could be related to underlying splenic vein thrombosis. Consider further evaluation with sonography or a contrast-enhanced abdomen  and pelvis CT. 5. Trace pericardial effusion and mild anasarca. CTPA: No results found for this or any previous visit. CXR PA/LAT:   Results for orders placed during the hospital encounter of 06/20/19   XR CHEST STANDARD (2 VW)    Narrative EXAMINATION:  TWO XRAY VIEWS OF THE CHEST    6/20/2019 2:13 pm    COMPARISON:  06/05/2018    HISTORY:  ORDERING SYSTEM PROVIDED HISTORY: hypoxia, SOB  TECHNOLOGIST PROVIDED HISTORY:  Reason for exam:->hypoxia, SOB  Ordering Physician Provided Reason for Exam: hypoxia  Additional signs and symptoms: Other (not eating or drinking normally cant  remember when she ate last )  Relevant Medical/Surgical History: CHF    FINDINGS:  Cardial-pericardial silhouette is enlarged but stable.   There is increased  central and basilar opacity, with bilateral pleural effusions, greater on the  left, all of

## 2019-07-05 ENCOUNTER — OFFICE VISIT (OUTPATIENT)
Dept: CARDIOLOGY CLINIC | Age: 79
End: 2019-07-05
Payer: MEDICARE

## 2019-07-05 VITALS
OXYGEN SATURATION: 90 % | BODY MASS INDEX: 28.7 KG/M2 | DIASTOLIC BLOOD PRESSURE: 60 MMHG | HEIGHT: 63 IN | HEART RATE: 91 BPM | WEIGHT: 162 LBS | SYSTOLIC BLOOD PRESSURE: 120 MMHG

## 2019-07-05 DIAGNOSIS — I50.33 ACUTE ON CHRONIC DIASTOLIC HEART FAILURE (HCC): ICD-10-CM

## 2019-07-05 DIAGNOSIS — I35.0 AORTIC VALVE STENOSIS, ETIOLOGY OF CARDIAC VALVE DISEASE UNSPECIFIED: ICD-10-CM

## 2019-07-05 DIAGNOSIS — I21.4 NSTEMI (NON-ST ELEVATED MYOCARDIAL INFARCTION) (HCC): Primary | ICD-10-CM

## 2019-07-05 DIAGNOSIS — I48.92 ATRIAL FLUTTER, UNSPECIFIED TYPE (HCC): ICD-10-CM

## 2019-07-05 PROCEDURE — 93000 ELECTROCARDIOGRAM COMPLETE: CPT | Performed by: NURSE PRACTITIONER

## 2019-07-05 PROCEDURE — 99214 OFFICE O/P EST MOD 30 MIN: CPT | Performed by: NURSE PRACTITIONER

## 2019-07-05 RX ORDER — METOPROLOL SUCCINATE 25 MG/1
12.5 TABLET, EXTENDED RELEASE ORAL DAILY
Qty: 45 TABLET | Refills: 3 | Status: SHIPPED | OUTPATIENT
Start: 2019-07-05

## 2019-07-05 NOTE — PATIENT INSTRUCTIONS
Heart Failure: Care Instructions. \"     If you do not have an account, please click on the \"Sign Up Now\" link. Current as of: July 22, 2018  Content Version: 12.0  © 1751-8423 [employer]. Care instructions adapted under license by [facility] ([facility]). If you have questions about a medical condition or this instruction, always ask your healthcare professional. [facility] any warranty or liability for your use of this information. Patient Education        Learning About Heart Failure  What is heart failure? Heart failure means that your heart muscle does not pump as much blood as your body needs. Failure does not mean that your heart has stopped. It means that your heart is not pumping as well as it should. Your body has an amazing ability to make up for heart failure. It may do such a good job that you don't know you have a disease. But at some point, your heart and body will no longer be able to keep up. Then fluid starts to build up in your lungs and other parts of your body. What can you expect when you have heart failure? Heart failure is a lifelong (chronic) disease. Treatment may be able to slow the disease and help you feel better. But heart failure tends to get worse over time. Despite this, there are many steps you can take to feel better and stay healthy longer. Early on, your symptoms may not be too bad. As heart failure gets worse, symptoms typically get worse, and you may need to limit your activities. Heart failure can also get worse suddenly. If this happens, you need emergency care. Then, after treatment, your symptoms may go back to being stable (which means they stay the same) for a long time. Heart failure can lead to other health problems, such as heart rhythm problems. Over time, your treatment options may change, especially as your symptoms get worse. As heart failure gets worse, palliative care can help improve the quality of your life.  You can and call your doctor if you are having problems. It's also a good idea to know your test results and keep a list of the medicines you take. Where can you learn more? Go to https://[employer]. org and sign in to your [employer] account. Enter X345 in the [employer].com box to learn more about \"Learning About Heart Failure. \"     If you do not have an account, please click on the \"Sign Up Now\" link. Current as of: July 22, 2018  Content Version: 12.0  © 6393-0061 [employer]. Care instructions adapted under license by [facility] ([facility]). If you have questions about a medical condition or this instruction, always ask your healthcare professional. [address] any warranty or liability for your use of this information. Patient Education        Avoiding Triggers With Heart Failure: Care Instructions  Your Care Instructions    Triggers are anything that make your heart failure flare up. A flare-up is also called \"sudden heart failure\" or \"acute heart failure. \" When you have a flare-up, fluid builds up in your lungs, and you have problems breathing. You might need to go to the hospital. By watching for changes in your condition and avoiding triggers, you can prevent heart failure flare-ups. Follow-up care is a key part of your treatment and safety. Be sure to make and go to all appointments, and call your doctor if you are having problems. It's also a good idea to know your test results and keep a list of the medicines you take. How can you care for yourself at home? Watch for changes in your weight and condition  · Weigh yourself without clothing at the same time each day. Record your weight. Call your doctor if you have sudden weight gain, such as more than 2 to 3 pounds in a day or 5 pounds in a week. (Your doctor may suggest a different range of weight gain.) A sudden weight gain may mean that your heart failure is getting worse.   · Keep a daily record

## 2019-07-05 NOTE — PROGRESS NOTES
dysfunction). Stroke volume: 66ml.  Stroke volume/bsa: 36ml/m^2. - Aortic valve: Moderate focal thickening and calcification. Cusp separation was moderately reduced. There was moderate stenosis. There was trivial regurgitation. Mean gradient (S): 14mm Hg. Peak gradient (S): 26mm Hg. Valve area (VTI): 1.2cm^2. Indexed valve area (VTI): 0.66cm^2/m^2. Valve area (Vmax): 1.25cm^2. Indexed valve area (Vmax): 0.69cm^2/m^2. - Aorta: Aortic root dimension: 3.9cm (ED). Ascending aortic diameter: 4.4cm (S).  - Mitral valve: The annulus was mildly calcified. Mild thickening and calcification of the posterior leaflet. - Left atrium: The atrium was moderately dilated. - Right ventricle: The cavity size was mildly dilated. Wall thickness was normal.  - Right atrium: The atrium was markedly dilated. - Inferior vena cava: The vessel was dilated; the respirophasic diameter changes were in the normal range (>= 50%); findings are consistent with mildly elevated central venous pressure. - Pulmonary arteries: PA peak pressure: 56mm Hg (S).    12/1/2017  Summary   Left ventricle size is normal.   Normal left ventricular wall thickness.   Global left ventricular function is increased (hyperdynamic) with ejection   fraction estimated from 70 % to 75 %.   Diastolic filling parameters suggests grade I diastolic dysfunction .   Mitral annular calcification is present.   The mitral valve apparatus is calcified and no hemodynamically significant   stenosis.   Mild mitral regurgitation is present.   Severely dilated left atrium.   The aortic valve appears calcified, consistent with aortic stenosis.   Mild aortic regurgitation is present.  Shyam Huh is mild aortic stenosis, JOI by continuity 1.56 cm sq., Peak gradient   27 mmHg, Mean gradient 16 mmHg.   Tricuspid annular calcification.   There is moderate tricuspid regurgitation with RVSP estimated at 55 mmHg,   c/w mild to moderate pulmonary HTN.    Right atrial size is moderately to severely

## 2019-09-04 ENCOUNTER — HOSPITAL ENCOUNTER (OUTPATIENT)
Age: 79
Discharge: HOME | End: 2019-09-04
Payer: MEDICARE

## 2019-09-04 LAB
EKG ATRIAL RATE: 85 BPM
EKG DIAGNOSIS: NORMAL
EKG P AXIS: 0 DEGREES
EKG P-R INTERVAL: 230 MS
EKG Q-T INTERVAL: 382 MS
EKG QRS DURATION: 104 MS
EKG QTC CALCULATION (BAZETT): 454 MS
EKG R AXIS: -27 DEGREES
EKG T AXIS: 26 DEGREES
EKG VENTRICULAR RATE: 85 BPM

## 2019-09-04 PROCEDURE — 93005 ELECTROCARDIOGRAM TRACING: CPT | Performed by: NURSE PRACTITIONER

## 2019-09-04 PROCEDURE — 93010 ELECTROCARDIOGRAM REPORT: CPT | Performed by: INTERNAL MEDICINE

## 2020-06-10 ENCOUNTER — HOSPITAL ENCOUNTER (OUTPATIENT)
Age: 80
Discharge: HOME | End: 2020-06-10
Payer: MEDICARE

## 2020-06-10 PROCEDURE — 93005 ELECTROCARDIOGRAM TRACING: CPT | Performed by: CLINICAL NURSE SPECIALIST

## 2020-06-11 LAB
EKG ATRIAL RATE: 87 BPM
EKG DIAGNOSIS: NORMAL
EKG P AXIS: 67 DEGREES
EKG P-R INTERVAL: 214 MS
EKG Q-T INTERVAL: 380 MS
EKG QRS DURATION: 88 MS
EKG QTC CALCULATION (BAZETT): 457 MS
EKG R AXIS: -18 DEGREES
EKG T AXIS: 47 DEGREES
EKG VENTRICULAR RATE: 87 BPM

## 2020-06-11 PROCEDURE — 93010 ELECTROCARDIOGRAM REPORT: CPT | Performed by: INTERNAL MEDICINE

## 2021-04-12 DIAGNOSIS — Z23 NEED FOR VACCINATION: ICD-10-CM

## 2021-07-06 NOTE — CARE COORDINATION
D/c order acknowledged. Patient will return home with 24hr assist from son. [facility] notified of d/c. [facility] following for wheeled walker(DME ordered) and possible shower chair(DME Ordered). No other needs expressed.      Pattieia Number, [id number] [facility]  [phone]  7/2/19 at 12:06 PM Never

## 2022-02-09 NOTE — PROGRESS NOTES
to the splenic hilum, an indeterminate   finding that could be related to underlying splenic vein thrombosis. Consider further evaluation with sonography or a contrast-enhanced abdomen   and pelvis CT. 5. Trace pericardial effusion and mild anasarca. XR CHEST STANDARD (2 VW)   Final Result   Findings most compatible with worsening or recurrent pulmonary edema. Correlate with any clinical evidence of superimposed infection. Assessment/Plan:    Active Hospital Problems    Diagnosis    PSVT (paroxysmal supraventricular tachycardia) ([facility]) [I47.1]    Demand ischemia ([facility] [I24.8]    Chronic diastolic heart failure due to hypertrophic obstructive cardiomyopathy ([facility]) [I50.32, I42.1]    Acute respiratory failure with hypoxia ([facility]) [J96.01]    Nonrheumatic aortic valve stenosis [I35.0]    [facility]STEMI (non-ST elevated myocardial infarction) [facility] [I21.4]    Cellulitis of left upper extremity [L03.114]    Acute cystitis without hematuria [N30.00]    Leukocytosis [D72.829]    CHF (congestive heart failure), NYHA class I, acute on chronic, combined ([facility]) [I50.43]    CKD (chronic kidney disease) stage 2, GFR 60-89 ml/min [N18.2]           CHF:   -A 2D Echocardiogram on March 26, 2019 [facility]) shows an EF of 55-60%, X-ray shows pulmonary edema, BMP 52,849  -  Monitor strict I&Os and daily weights. - Cardiology has been consulted.   - The CHF nurse has been consulted to discuss disease management      UTI:  -Rocephin started  - UA cultures with GNR  -Supportive care     NSTEMI:  -Suspect secondary to chronic kidney disease and CHF  -Initial troponin 0.25-->0.28  -Continue to trend the troponin  -Cardiology has been consulted  -no further cardiac intervention per cardio     Cellulitis of left hand:  -Patient has no known mechanism of injury  -White count elevated 15  -IV Rocephin  -Continue to trend white blood cell count     Leukocytosis:  -Suspect secondary to cellulitis of left hand and UTI  -Continue to trend     ARF on Chronic kidney disease stage II:  -Baseline BUN 22-24 today 49  -Baseline creatinine 1.1- 1.3, Today 2.4-->2.2  -Nephrology consulted        DVT Prophylaxis: heparin  Diet: DIET LOW SODIUM 2 GM;  Code Status: Full Code    PT/OT Eval Status: ordered    Meño Rogers MD Detail Level: Detailed Quality 265: Biopsy Follow-Up: Biopsy results reviewed, communicated, tracked, and documented